# Patient Record
Sex: MALE | Race: WHITE | NOT HISPANIC OR LATINO | URBAN - METROPOLITAN AREA
[De-identification: names, ages, dates, MRNs, and addresses within clinical notes are randomized per-mention and may not be internally consistent; named-entity substitution may affect disease eponyms.]

---

## 2024-08-05 ENCOUNTER — HOSPITAL ENCOUNTER (OUTPATIENT)
Dept: GASTROENTEROLOGY | Facility: HOSPITAL | Age: 64
Discharge: HOME | End: 2024-08-05
Payer: MEDICARE

## 2024-08-05 ENCOUNTER — ANESTHESIA EVENT (OUTPATIENT)
Dept: GASTROENTEROLOGY | Facility: HOSPITAL | Age: 64
End: 2024-08-05
Payer: MEDICARE

## 2024-08-05 ENCOUNTER — ANESTHESIA (OUTPATIENT)
Dept: GASTROENTEROLOGY | Facility: HOSPITAL | Age: 64
End: 2024-08-05
Payer: MEDICARE

## 2024-08-05 VITALS
WEIGHT: 204 LBS | RESPIRATION RATE: 14 BRPM | TEMPERATURE: 97.4 F | HEART RATE: 73 BPM | SYSTOLIC BLOOD PRESSURE: 134 MMHG | OXYGEN SATURATION: 97 % | BODY MASS INDEX: 29.2 KG/M2 | DIASTOLIC BLOOD PRESSURE: 92 MMHG | HEIGHT: 70 IN

## 2024-08-05 DIAGNOSIS — Z86.010 HISTORY OF COLON POLYPS: ICD-10-CM

## 2024-08-05 DIAGNOSIS — Z12.11 SCREEN FOR COLON CANCER: ICD-10-CM

## 2024-08-05 PROCEDURE — 7100000010 HC PHASE TWO TIME - EACH INCREMENTAL 1 MINUTE

## 2024-08-05 PROCEDURE — 88305 TISSUE EXAM BY PATHOLOGIST: CPT | Mod: TC,PORLAB | Performed by: SURGERY

## 2024-08-05 PROCEDURE — 2500000005 HC RX 250 GENERAL PHARMACY W/O HCPCS: Performed by: NURSE ANESTHETIST, CERTIFIED REGISTERED

## 2024-08-05 PROCEDURE — 7100000009 HC PHASE TWO TIME - INITIAL BASE CHARGE

## 2024-08-05 PROCEDURE — 3700000001 HC GENERAL ANESTHESIA TIME - INITIAL BASE CHARGE

## 2024-08-05 PROCEDURE — 45380 COLONOSCOPY AND BIOPSY: CPT | Performed by: SURGERY

## 2024-08-05 PROCEDURE — 2500000004 HC RX 250 GENERAL PHARMACY W/ HCPCS (ALT 636 FOR OP/ED): Performed by: NURSE ANESTHETIST, CERTIFIED REGISTERED

## 2024-08-05 PROCEDURE — 2500000004 HC RX 250 GENERAL PHARMACY W/ HCPCS (ALT 636 FOR OP/ED): Performed by: SURGERY

## 2024-08-05 PROCEDURE — 3700000002 HC GENERAL ANESTHESIA TIME - EACH INCREMENTAL 1 MINUTE

## 2024-08-05 RX ORDER — LOSARTAN POTASSIUM 50 MG/1
50 TABLET ORAL DAILY
COMMUNITY

## 2024-08-05 RX ORDER — PROPOFOL 10 MG/ML
INJECTION, EMULSION INTRAVENOUS AS NEEDED
Status: DISCONTINUED | OUTPATIENT
Start: 2024-08-05 | End: 2024-08-05

## 2024-08-05 RX ORDER — DOXEPIN HYDROCHLORIDE 10 MG/1
10 CAPSULE ORAL NIGHTLY
COMMUNITY
Start: 2024-02-10

## 2024-08-05 RX ORDER — EZETIMIBE 10 MG/1
1 TABLET ORAL
COMMUNITY
Start: 2023-11-15

## 2024-08-05 RX ORDER — METOPROLOL SUCCINATE 25 MG/1
1 TABLET, EXTENDED RELEASE ORAL
COMMUNITY
Start: 2024-02-28

## 2024-08-05 RX ORDER — NAPROXEN SODIUM 220 MG/1
81 TABLET, FILM COATED ORAL
COMMUNITY

## 2024-08-05 RX ORDER — NEBIVOLOL 10 MG/1
10 TABLET ORAL
COMMUNITY

## 2024-08-05 RX ORDER — LIDOCAINE HYDROCHLORIDE 20 MG/ML
INJECTION, SOLUTION EPIDURAL; INFILTRATION; INTRACAUDAL; PERINEURAL AS NEEDED
Status: DISCONTINUED | OUTPATIENT
Start: 2024-08-05 | End: 2024-08-05

## 2024-08-05 RX ORDER — EMPAGLIFLOZIN 25 MG/1
1 TABLET, FILM COATED ORAL
COMMUNITY
Start: 2024-07-08

## 2024-08-05 RX ORDER — SODIUM CHLORIDE 9 MG/ML
20 INJECTION, SOLUTION INTRAVENOUS CONTINUOUS
Status: DISCONTINUED | OUTPATIENT
Start: 2024-08-05 | End: 2024-08-06 | Stop reason: HOSPADM

## 2024-08-05 RX ORDER — ROSUVASTATIN CALCIUM 40 MG/1
40 TABLET, COATED ORAL DAILY
COMMUNITY

## 2024-08-05 RX ORDER — CALC/MAG/B COMPLEX/D3/HERB 61
15 TABLET ORAL
COMMUNITY
Start: 2024-02-23

## 2024-08-05 SDOH — HEALTH STABILITY: MENTAL HEALTH: CURRENT SMOKER: 1

## 2024-08-05 ASSESSMENT — PAIN SCALES - GENERAL
PAINLEVEL_OUTOF10: 0 - NO PAIN
PAINLEVEL_OUTOF10: 0 - NO PAIN
PAIN_LEVEL: 0
PAINLEVEL_OUTOF10: 0 - NO PAIN

## 2024-08-05 ASSESSMENT — COLUMBIA-SUICIDE SEVERITY RATING SCALE - C-SSRS
2. HAVE YOU ACTUALLY HAD ANY THOUGHTS OF KILLING YOURSELF?: NO
6. HAVE YOU EVER DONE ANYTHING, STARTED TO DO ANYTHING, OR PREPARED TO DO ANYTHING TO END YOUR LIFE?: NO
1. IN THE PAST MONTH, HAVE YOU WISHED YOU WERE DEAD OR WISHED YOU COULD GO TO SLEEP AND NOT WAKE UP?: NO

## 2024-08-05 ASSESSMENT — PAIN - FUNCTIONAL ASSESSMENT: PAIN_FUNCTIONAL_ASSESSMENT: 0-10

## 2024-08-05 NOTE — ANESTHESIA PREPROCEDURE EVALUATION
Patient: Ricardo Nam    Procedure Information       Date/Time: 08/05/24 0930    Scheduled providers: Julián Dickson MD    Procedure: COLONOSCOPY    Location: [facility]            Relevant Problems   Anesthesia (within normal limits)       Clinical information reviewed:   Tobacco  Allergies  Meds   Med Hx  Surg Hx   Fam Hx  Soc Hx        NPO Detail:  NPO/Void Status  Carbohydrate Drink Given Prior to Surgery? : N  Date of Last Liquid: 08/05/24  Time of Last Liquid: 0730  Date of Last Solid: 08/03/24  Last Intake Type: Clear fluids         Physical Exam    Airway  Mallampati: III  TM distance: >3 FB  Neck ROM: full     Cardiovascular - normal exam     Dental - normal exam     Pulmonary - normal exam     Abdominal - normal exam             Anesthesia Plan    History of general anesthesia?: yes  History of complications of general anesthesia?: no    ASA 2     MAC     The patient is a current smoker.  Patient was previously instructed to abstain from smoking on day of procedure.  Patient smoked on day of procedure.    intravenous induction   Anesthetic plan and risks discussed with patient.    Plan discussed with CRNA.

## 2024-08-05 NOTE — ANESTHESIA POSTPROCEDURE EVALUATION
Patient: Ricardo Nam    Procedure Summary       Date: 08/05/24 Room / Location: [facility]    Anesthesia Start: 1009 Anesthesia Stop: 1043    Procedure: COLONOSCOPY Diagnosis:       Screen for colon cancer      History of colon polyps    Scheduled Providers: Julián Dickson MD Responsible Provider: JENNIFER Cobian    Anesthesia Type: MAC ASA Status: 2            Anesthesia Type: MAC    Vitals Value Taken Time   /77 08/05/24 1040   Temp 37.3 °C (99.2 °F) 08/05/24 1040   Pulse 86 08/05/24 1040   Resp 16 08/05/24 1040   SpO2 96 % 08/05/24 1040       Anesthesia Post Evaluation    Patient location during evaluation: PACU  Patient participation: complete - patient participated  Level of consciousness: awake and alert  Pain score: 0  Pain management: adequate  Airway patency: patent  Cardiovascular status: acceptable and hemodynamically stable  Respiratory status: acceptable, spontaneous ventilation and room air  Hydration status: acceptable  Postoperative Nausea and Vomiting: none        There were no known notable events for this encounter.

## 2024-08-05 NOTE — H&P
"History Of Present Illness  Ricardo Nam is a 64 y.o. male presenting with hx polyps.     Past Medical History  He has a past medical history of Diabetes type 2, controlled (Multi), Hyperlipidemia, and Hypertension.    Surgical History  He has no past surgical history on file.     Social History  He reports that he has been smoking cigarettes. He has never used smokeless tobacco. He reports that he does not currently use alcohol. He reports current drug use. Drug: Marijuana.    Family History  No family history on file.     Allergies  Patient has no known allergies.    Review of Systems   All other systems reviewed and are negative.       Physical Exam  Vitals reviewed.   Cardiovascular:      Rate and Rhythm: Normal rate and regular rhythm.   Pulmonary:      Breath sounds: Normal breath sounds.   Skin:     General: Skin is warm and dry.   Neurological:      Mental Status: He is alert.   Psychiatric:         Mood and Affect: Mood normal.          Last Recorded Vitals  Blood pressure (!) 149/94, pulse 84, temperature 36.2 °C (97.2 °F), temperature source Temporal, resp. rate 17, height 1.778 m (5' 10\"), weight 92.5 kg (204 lb), SpO2 98%.    Relevant Results               Assessment/Plan   Active Problems:  There are no active Hospital Problems.      For colo       I spent 15 minutes in the professional and overall care of this patient.      Julián Dickson MD  "

## 2024-08-09 LAB
LABORATORY COMMENT REPORT: NORMAL
PATH REPORT.FINAL DX SPEC: NORMAL
PATH REPORT.GROSS SPEC: NORMAL
PATH REPORT.RELEVANT HX SPEC: NORMAL
PATH REPORT.TOTAL CANCER: NORMAL

## 2024-08-19 ENCOUNTER — HOSPITAL ENCOUNTER (OUTPATIENT)
Dept: RADIOLOGY | Facility: CLINIC | Age: 64
Discharge: HOME | End: 2024-08-19
Payer: MEDICARE

## 2024-08-19 DIAGNOSIS — M54.16 RADICULOPATHY, LUMBAR REGION: ICD-10-CM

## 2024-08-19 PROCEDURE — 72148 MRI LUMBAR SPINE W/O DYE: CPT

## 2024-08-19 PROCEDURE — 72148 MRI LUMBAR SPINE W/O DYE: CPT | Performed by: RADIOLOGY

## 2024-10-14 ENCOUNTER — OFFICE VISIT (OUTPATIENT)
Dept: PAIN MEDICINE | Facility: HOSPITAL | Age: 64
End: 2024-10-14
Payer: MEDICARE

## 2024-10-14 VITALS
OXYGEN SATURATION: 97 % | RESPIRATION RATE: 16 BRPM | HEART RATE: 68 BPM | BODY MASS INDEX: 28.88 KG/M2 | HEIGHT: 69 IN | DIASTOLIC BLOOD PRESSURE: 82 MMHG | WEIGHT: 195 LBS | SYSTOLIC BLOOD PRESSURE: 123 MMHG

## 2024-10-14 DIAGNOSIS — M47.816 LUMBAR SPONDYLOSIS: ICD-10-CM

## 2024-10-14 DIAGNOSIS — M48.062 LUMBAR STENOSIS WITH NEUROGENIC CLAUDICATION: Primary | ICD-10-CM

## 2024-10-14 PROCEDURE — 1159F MED LIST DOCD IN RCRD: CPT | Performed by: PHYSICAL MEDICINE & REHABILITATION

## 2024-10-14 PROCEDURE — 99214 OFFICE O/P EST MOD 30 MIN: CPT | Performed by: PHYSICAL MEDICINE & REHABILITATION

## 2024-10-14 PROCEDURE — 99204 OFFICE O/P NEW MOD 45 MIN: CPT | Performed by: PHYSICAL MEDICINE & REHABILITATION

## 2024-10-14 PROCEDURE — 3008F BODY MASS INDEX DOCD: CPT | Performed by: PHYSICAL MEDICINE & REHABILITATION

## 2024-10-14 RX ORDER — GABAPENTIN 300 MG/1
CAPSULE ORAL
Qty: 180 CAPSULE | Refills: 2 | Status: SHIPPED | OUTPATIENT
Start: 2024-10-14

## 2024-10-14 RX ORDER — DIAZEPAM 5 MG/1
5 TABLET ORAL ONCE AS NEEDED
OUTPATIENT
Start: 2024-10-14

## 2024-10-14 RX ORDER — MELOXICAM 15 MG/1
15 TABLET ORAL DAILY
COMMUNITY

## 2024-10-14 RX ORDER — DICLOFENAC SODIUM 10 MG/G
4 GEL TOPICAL 4 TIMES DAILY PRN
COMMUNITY

## 2024-10-14 RX ORDER — GABAPENTIN 300 MG/1
300 CAPSULE ORAL 3 TIMES DAILY
COMMUNITY
End: 2024-10-14

## 2024-10-14 ASSESSMENT — ENCOUNTER SYMPTOMS
BOWEL INCONTINENCE: 0
WEAKNESS: 0
TINGLING: 1
NUMBNESS: 1
CHILLS: 0
OCCASIONAL FEELINGS OF UNSTEADINESS: 0
LOSS OF SENSATION IN FEET: 0
FEVER: 0
BACK PAIN: 1
UNEXPECTED WEIGHT CHANGE: 0
LEG PAIN: 1
MYALGIAS: 0

## 2024-10-14 ASSESSMENT — COLUMBIA-SUICIDE SEVERITY RATING SCALE - C-SSRS
2. HAVE YOU ACTUALLY HAD ANY THOUGHTS OF KILLING YOURSELF?: NO
1. IN THE PAST MONTH, HAVE YOU WISHED YOU WERE DEAD OR WISHED YOU COULD GO TO SLEEP AND NOT WAKE UP?: NO
6. HAVE YOU EVER DONE ANYTHING, STARTED TO DO ANYTHING, OR PREPARED TO DO ANYTHING TO END YOUR LIFE?: NO

## 2024-10-14 ASSESSMENT — PATIENT HEALTH QUESTIONNAIRE - PHQ9
2. FEELING DOWN, DEPRESSED OR HOPELESS: NOT AT ALL
SUM OF ALL RESPONSES TO PHQ9 QUESTIONS 1 AND 2: 0
1. LITTLE INTEREST OR PLEASURE IN DOING THINGS: NOT AT ALL

## 2024-10-14 NOTE — H&P (VIEW-ONLY)
Subjective   Patient ID: Ricardo Nam is a 64 y.o. male who presents for Back Pain.  Mr. Ricardo Nam is a 64M presenting with chronic right-sided low back pain that began about a year ago without any inciting event. The pain is unchanged since onset. He describes a constant dull ache concentrated at his right low back with radiation to the right greater trochanter. The pain is occasionally associated with numbness and tingling down the posterior aspect of his right lower extremity above the knee. The pain is constant, but is worst at night. He has difficulty walking, his right leg often feels heavy, and he has relief leaning forward. It has significantly changed his quality of life and interfered with his sleep. Patient reports that he was previously active throughout the entire day, playing golf or doing yard work. He has been unable to golf and recently had to hire lawn care. He rates the pain as 5/10 in severity currently, and increases to 7/10 at its worst. Patient recently completed physical therapy without any improvement. He was previously taking gabapentin 300mg once daily without improvement. He gets mild relief with Meloxicam 15mg. Denies red flag symptoms, including loss of bowel or bladder function and saddle anesthesia.     Back Pain  This is a chronic problem. The current episode started more than 1 month ago. The problem occurs constantly. The problem is unchanged. The pain is present in the lumbar spine. The pain radiates to the right thigh. The pain is at a severity of 5/10. The pain is moderate. The pain is Worse during the night. The symptoms are aggravated by sitting, standing and position. Associated symptoms include leg pain, numbness and tingling. Pertinent negatives include no bladder incontinence, bowel incontinence, fever or weakness. He has tried chiropractic manipulation, ice, NSAIDs, analgesics, heat, bed rest and home exercises for the symptoms. The treatment provided mild relief.        Patient presents to office for initial eval of lower back pain that radiates into the right hip. Patient endorses numbness and tingling.    Pain Score: 5/10    Injections/Procedures: denies    Neuromodulators/Other Medications: gabapentin 300mg, meloxicam 15, diclofenac gel topical    Other: PT completed    OSWESTRY SCORE: 32                    Monitoring and compliance:    ORT:    PDUQ:    Office Agreement:      Review of Systems   Constitutional:  Negative for chills, fever and unexpected weight change.   Gastrointestinal:  Negative for bowel incontinence.   Genitourinary:  Negative for bladder incontinence and enuresis.   Musculoskeletal:  Positive for back pain. Negative for gait problem and myalgias.   Skin:  Negative for rash.   Neurological:  Positive for tingling and numbness. Negative for weakness.        Current Outpatient Medications   Medication Instructions    aspirin 81 mg, oral, Daily RT    diclofenac sodium (VOLTAREN) 4 g, Topical, 4 times daily PRN    doxepin (SINEQUAN) 10 mg, oral, Nightly    ezetimibe (Zetia) 10 mg tablet 1 tablet, oral, Daily (0630)    gabapentin (Neurontin) 300 mg capsule Day 1 Take 300mg at bedtime, Day 2 300mg BID, Day 3 300mg TID, Day 4-6 300mg AM 300mg afternoon 600mg at bedtime, Day 7-9 300mg Am, 600mg afternoon, 600mg at bedtime, Day 10 and after 600mg TID.    Jardiance 25 mg 1 tablet, oral, Daily (0630)    lansoprazole (PREVACID) 15 mg, oral, Daily before breakfast    losartan (COZAAR) 50 mg, oral, Daily    meloxicam (MOBIC) 15 mg, oral, Daily    metoprolol succinate XL (Toprol-XL) 25 mg 24 hr tablet 1 tablet, oral, Daily (0630)    nebivolol (BYSTOLIC) 10 mg, oral, Daily RT    rosuvastatin (CRESTOR) 40 mg, oral, Daily        Past Medical History:   Diagnosis Date    Diabetes type 2, controlled (Multi)     Hyperlipidemia     Hypertension         No past surgical history on file.     No family history on file.     No Known Allergies     MR lumbar spine wo IV contrast  08/19/2024    Narrative  Interpreted By:  Drew Albert,  STUDY:  MR LUMBAR SPINE WO IV CONTRAST;  8/19/2024 8:16 am    INDICATION:  Signs/Symptoms:..    COMPARISON:  None.    ACCESSION NUMBER(S):  [id number]    ORDERING CLINICIAN:  VIVIAN GIBSON    TECHNIQUE:  The lumbar spine was studied in the sagital, axial and coronal planes  utiliing T1 and T2 weighted images.    FINDINGS:  The marrow signal and vertebral body height are normal. The conus and  sacrum are normal. Images at each interspace reveal the following:  T12/L1  There is normal alignment and vertebral body height. The disc space  is normal. There is no evidence of canal or foraminal narrowing.  There is no evidence of bulging or herniated disc. L1/L2  There is normal alignment and vertebral body height. The disc space  is normal. There is no evidence of canal or foraminal narrowing.  There is no evidence of bulging or herniated disc. L2/L3  There is normal alignment and vertebral body height. The disc space  is normal. There is no evidence of canal or foraminal narrowing.  There is no evidence of bulging or herniated disc. L3/L4  Bulging intervertebral disc and bilateral facet hypertrophy without  canal stenosis. Moderate bilateral foraminal narrowing without focal  disc herniation L4/L5  Circumferential bulging intervertebral disc. Bilateral facet  hypertrophy. No measurable canal stenosis. Moderate bilateral  foraminal narrowing. L5/S1  Circumferential bulging intervertebral disc. Bilateral facet  hypertrophy. No measurable canal stenosis. Moderate/advanced  bilateral foraminal narrowing.    Impression  * Lumbar spondylosis as described  *No measurable canal stenosis or focal disc herniation.    MACRO:  none    Signed by: Drew Albert 8/19/2024 10:15 AM  Dictation workstation:   [id number]      Objective     Vitals:    10/14/24 0942   BP: 123/82   Pulse: 68   Resp: 16   SpO2: 97%        Physical Exam  Constitutional:       Appearance:  Normal appearance.   HENT:      Head: Normocephalic.      Mouth/Throat:      Mouth: Mucous membranes are moist.   Pulmonary:      Effort: Pulmonary effort is normal.   Musculoskeletal:         General: No tenderness (No point tenderness along lumbar spine, paraspinous muscles, or SIJ), deformity or signs of injury.      Cervical back: Normal range of motion. No rigidity or tenderness.      Right lower leg: No edema.      Left lower leg: No edema.      Comments: ROM limited secondary to pain, equal with flexion and extension  Positive shopping cart sign   Skin:     General: Skin is warm and dry.   Neurological:      General: No focal deficit present.      Mental Status: He is alert and oriented to person, place, and time.      Cranial Nerves: No cranial nerve deficit.      Sensory: No sensory deficit.      Motor: No weakness.      Coordination: Coordination normal.      Gait: Gait normal.      Deep Tendon Reflexes: Reflexes normal.      Comments: Negative straight leg test bilaterally  Negative Carol's test bilaterally   Psychiatric:         Mood and Affect: Mood normal.         Behavior: Behavior normal.         GENERAL EXAM  Vital Signs: Vital signs to include heart rate, respiration rate, blood pressure, and temperature were reviewed.  General Appearance:  Awake, alert, healthy appearing, well developed, No acute distress.  Head: Normocephalic without evidence of head injury.  Neck: The appearance of the neck was normal without swelling with a midline trachea.  Eyes: The eyelids and eyebrows exhibited no abnormalities.  Pupils were not pin-point.  Sclera was without icterus.  Lungs: Respiration rhythm and depth was normal.  Respiratory movements were normal without labored breathing.  Cardiovascular: No peripheral edema was present.    Neurological: Patient was oriented to time, place, and person.  Speech was normal.  Balance, gait, and stance were unremarkable.    Psychiatric: Appearance was normal with  appropriate dress.  Mood was euthymic and affect was normal.  Skin: Affected regions were without ecchymosis or skin lesions.      Assessment/Plan   Mr. Ricardo Nam is a 64M presenting with chronic right-sided low back pain that began about a year ago likely secondary to lumbar stenosis with neurogenic claudication.  I did personally review his MRI of his lumbar spine which showed multilevel degenerative changes but I do disagree with the radiologist reading that there does appear to be mild to moderate central canal and lateral recess stenosis at L4-5 especially due to ligamentum flavum hypertrophy and facet arthropathy.  Patient recently completed physical therapy without much improvement. He was started on gabapentin 300mg but stopped taking it due to ineffectiveness. Discussed that this is a subtherapeutic dose and recommend restarting with plan to titrate higher as tolerated. Patient is agreeable. Discussed scheduling epidural steroid injection and will plan to follow-up after procedure.    Plan:  - Restart gabapentin 600mg TID  - Schedule epidural steroid injection at L4-5.  Discussed the risk benefits and alternatives and patient would like to proceed with the procedure.  - Follow-up after procedure  - Could consider the addition of duloxetine in the future.    Diagnoses and all orders for this visit:  Lumbar stenosis with neurogenic claudication  -     gabapentin (Neurontin) 300 mg capsule; Day 1 Take 300mg at bedtime, Day 2 300mg BID, Day 3 300mg TID, Day 4-6 300mg AM 300mg afternoon 600mg at bedtime, Day 7-9 300mg Am, 600mg afternoon, 600mg at bedtime, Day 10 and after 600mg TID.  -     FL pain management; Future  -     Epidural Steroid Injection; Future  Lumbar spondylosis  -     gabapentin (Neurontin) 300 mg capsule; Day 1 Take 300mg at bedtime, Day 2 300mg BID, Day 3 300mg TID, Day 4-6 300mg AM 300mg afternoon 600mg at bedtime, Day 7-9 300mg Am, 600mg afternoon, 600mg at bedtime, Day 10 and after  600mg TID.  Other orders  -     NPO Diet Except: Sips with meds; Effective now; Standing  -     Height and weight; Standing  -     Insert and maintain peripheral IV; Standing  -     Saline lock IV; Standing  -     POCT Glucose; Standing  -     Type And Screen; Standing  -     diazePAM (Valium) tablet 5 mg  -     Adult diet Regular; Standing  -     Vital Signs; Standing  -     Notify physician - Standard Parameters; Standing  -     Continue IV fluids ordered pre-procedure; Standing  -     Prior to Discharge O2 Weaning; Standing  -     Pulse oximetry, continuous; Standing  -     Discharge patient; Standing           This note was generated with the aid of dictation software, there may be typos despite my attempts at proofreading.     I saw and evaluated the patient. I personally obtained the key and critical portions of the history and physical exam or was physically present for key and critical portions performed by the resident/fellow. I reviewed the resident/fellow's documentation and discussed the patient with the resident/fellow. I agree with the resident/fellow's medical decision making as documented in the note.    Julián Byrd MD

## 2024-10-14 NOTE — PROGRESS NOTES
Subjective   Patient ID: Ricardo Nam is a 64 y.o. male who presents for Back Pain.  Mr. Ricardo Nam is a 64M presenting with chronic right-sided low back pain that began about a year ago without any inciting event. The pain is unchanged since onset. He describes a constant dull ache concentrated at his right low back with radiation to the right greater trochanter. The pain is occasionally associated with numbness and tingling down the posterior aspect of his right lower extremity above the knee. The pain is constant, but is worst at night. He has difficulty walking, his right leg often feels heavy, and he has relief leaning forward. It has significantly changed his quality of life and interfered with his sleep. Patient reports that he was previously active throughout the entire day, playing golf or doing yard work. He has been unable to golf and recently had to hire lawn care. He rates the pain as 5/10 in severity currently, and increases to 7/10 at its worst. Patient recently completed physical therapy without any improvement. He was previously taking gabapentin 300mg once daily without improvement. He gets mild relief with Meloxicam 15mg. Denies red flag symptoms, including loss of bowel or bladder function and saddle anesthesia.     Back Pain  This is a chronic problem. The current episode started more than 1 month ago. The problem occurs constantly. The problem is unchanged. The pain is present in the lumbar spine. The pain radiates to the right thigh. The pain is at a severity of 5/10. The pain is moderate. The pain is Worse during the night. The symptoms are aggravated by sitting, standing and position. Associated symptoms include leg pain, numbness and tingling. Pertinent negatives include no bladder incontinence, bowel incontinence, fever or weakness. He has tried chiropractic manipulation, ice, NSAIDs, analgesics, heat, bed rest and home exercises for the symptoms. The treatment provided mild relief.        Patient presents to office for initial eval of lower back pain that radiates into the right hip. Patient endorses numbness and tingling.    Pain Score: 5/10    Injections/Procedures: denies    Neuromodulators/Other Medications: gabapentin 300mg, meloxicam 15, diclofenac gel topical    Other: PT completed    OSWESTRY SCORE: 32                    Monitoring and compliance:    ORT:    PDUQ:    Office Agreement:      Review of Systems   Constitutional:  Negative for chills, fever and unexpected weight change.   Gastrointestinal:  Negative for bowel incontinence.   Genitourinary:  Negative for bladder incontinence and enuresis.   Musculoskeletal:  Positive for back pain. Negative for gait problem and myalgias.   Skin:  Negative for rash.   Neurological:  Positive for tingling and numbness. Negative for weakness.        Current Outpatient Medications   Medication Instructions    aspirin 81 mg, oral, Daily RT    diclofenac sodium (VOLTAREN) 4 g, Topical, 4 times daily PRN    doxepin (SINEQUAN) 10 mg, oral, Nightly    ezetimibe (Zetia) 10 mg tablet 1 tablet, oral, Daily (0630)    gabapentin (Neurontin) 300 mg capsule Day 1 Take 300mg at bedtime, Day 2 300mg BID, Day 3 300mg TID, Day 4-6 300mg AM 300mg afternoon 600mg at bedtime, Day 7-9 300mg Am, 600mg afternoon, 600mg at bedtime, Day 10 and after 600mg TID.    Jardiance 25 mg 1 tablet, oral, Daily (0630)    lansoprazole (PREVACID) 15 mg, oral, Daily before breakfast    losartan (COZAAR) 50 mg, oral, Daily    meloxicam (MOBIC) 15 mg, oral, Daily    metoprolol succinate XL (Toprol-XL) 25 mg 24 hr tablet 1 tablet, oral, Daily (0630)    nebivolol (BYSTOLIC) 10 mg, oral, Daily RT    rosuvastatin (CRESTOR) 40 mg, oral, Daily        Past Medical History:   Diagnosis Date    Diabetes type 2, controlled (Multi)     Hyperlipidemia     Hypertension         No past surgical history on file.     No family history on file.     No Known Allergies     MR lumbar spine wo IV contrast  08/19/2024    Narrative  Interpreted By:  Drew Albert,  STUDY:  MR LUMBAR SPINE WO IV CONTRAST;  8/19/2024 8:16 am    INDICATION:  Signs/Symptoms:..    COMPARISON:  None.    ACCESSION NUMBER(S):  [id number]    ORDERING CLINICIAN:  VIVIAN GIBSON    TECHNIQUE:  The lumbar spine was studied in the sagital, axial and coronal planes  utiliing T1 and T2 weighted images.    FINDINGS:  The marrow signal and vertebral body height are normal. The conus and  sacrum are normal. Images at each interspace reveal the following:  T12/L1  There is normal alignment and vertebral body height. The disc space  is normal. There is no evidence of canal or foraminal narrowing.  There is no evidence of bulging or herniated disc. L1/L2  There is normal alignment and vertebral body height. The disc space  is normal. There is no evidence of canal or foraminal narrowing.  There is no evidence of bulging or herniated disc. L2/L3  There is normal alignment and vertebral body height. The disc space  is normal. There is no evidence of canal or foraminal narrowing.  There is no evidence of bulging or herniated disc. L3/L4  Bulging intervertebral disc and bilateral facet hypertrophy without  canal stenosis. Moderate bilateral foraminal narrowing without focal  disc herniation L4/L5  Circumferential bulging intervertebral disc. Bilateral facet  hypertrophy. No measurable canal stenosis. Moderate bilateral  foraminal narrowing. L5/S1  Circumferential bulging intervertebral disc. Bilateral facet  hypertrophy. No measurable canal stenosis. Moderate/advanced  bilateral foraminal narrowing.    Impression  * Lumbar spondylosis as described  *No measurable canal stenosis or focal disc herniation.    MACRO:  none    Signed by: Drew Albert 8/19/2024 10:15 AM  Dictation workstation:   [id number]      Objective     Vitals:    10/14/24 0942   BP: 123/82   Pulse: 68   Resp: 16   SpO2: 97%        Physical Exam  Constitutional:       Appearance:  Normal appearance.   HENT:      Head: Normocephalic.      Mouth/Throat:      Mouth: Mucous membranes are moist.   Pulmonary:      Effort: Pulmonary effort is normal.   Musculoskeletal:         General: No tenderness (No point tenderness along lumbar spine, paraspinous muscles, or SIJ), deformity or signs of injury.      Cervical back: Normal range of motion. No rigidity or tenderness.      Right lower leg: No edema.      Left lower leg: No edema.      Comments: ROM limited secondary to pain, equal with flexion and extension  Positive shopping cart sign   Skin:     General: Skin is warm and dry.   Neurological:      General: No focal deficit present.      Mental Status: He is alert and oriented to person, place, and time.      Cranial Nerves: No cranial nerve deficit.      Sensory: No sensory deficit.      Motor: No weakness.      Coordination: Coordination normal.      Gait: Gait normal.      Deep Tendon Reflexes: Reflexes normal.      Comments: Negative straight leg test bilaterally  Negative Carol's test bilaterally   Psychiatric:         Mood and Affect: Mood normal.         Behavior: Behavior normal.         GENERAL EXAM  Vital Signs: Vital signs to include heart rate, respiration rate, blood pressure, and temperature were reviewed.  General Appearance:  Awake, alert, healthy appearing, well developed, No acute distress.  Head: Normocephalic without evidence of head injury.  Neck: The appearance of the neck was normal without swelling with a midline trachea.  Eyes: The eyelids and eyebrows exhibited no abnormalities.  Pupils were not pin-point.  Sclera was without icterus.  Lungs: Respiration rhythm and depth was normal.  Respiratory movements were normal without labored breathing.  Cardiovascular: No peripheral edema was present.    Neurological: Patient was oriented to time, place, and person.  Speech was normal.  Balance, gait, and stance were unremarkable.    Psychiatric: Appearance was normal with  appropriate dress.  Mood was euthymic and affect was normal.  Skin: Affected regions were without ecchymosis or skin lesions.      Assessment/Plan   Mr. Ricardo Nam is a 64M presenting with chronic right-sided low back pain that began about a year ago likely secondary to lumbar stenosis with neurogenic claudication.  I did personally review his MRI of his lumbar spine which showed multilevel degenerative changes but I do disagree with the radiologist reading that there does appear to be mild to moderate central canal and lateral recess stenosis at L4-5 especially due to ligamentum flavum hypertrophy and facet arthropathy.  Patient recently completed physical therapy without much improvement. He was started on gabapentin 300mg but stopped taking it due to ineffectiveness. Discussed that this is a subtherapeutic dose and recommend restarting with plan to titrate higher as tolerated. Patient is agreeable. Discussed scheduling epidural steroid injection and will plan to follow-up after procedure.    Plan:  - Restart gabapentin 600mg TID  - Schedule epidural steroid injection at L4-5.  Discussed the risk benefits and alternatives and patient would like to proceed with the procedure.  - Follow-up after procedure  - Could consider the addition of duloxetine in the future.    Diagnoses and all orders for this visit:  Lumbar stenosis with neurogenic claudication  -     gabapentin (Neurontin) 300 mg capsule; Day 1 Take 300mg at bedtime, Day 2 300mg BID, Day 3 300mg TID, Day 4-6 300mg AM 300mg afternoon 600mg at bedtime, Day 7-9 300mg Am, 600mg afternoon, 600mg at bedtime, Day 10 and after 600mg TID.  -     FL pain management; Future  -     Epidural Steroid Injection; Future  Lumbar spondylosis  -     gabapentin (Neurontin) 300 mg capsule; Day 1 Take 300mg at bedtime, Day 2 300mg BID, Day 3 300mg TID, Day 4-6 300mg AM 300mg afternoon 600mg at bedtime, Day 7-9 300mg Am, 600mg afternoon, 600mg at bedtime, Day 10 and after  600mg TID.  Other orders  -     NPO Diet Except: Sips with meds; Effective now; Standing  -     Height and weight; Standing  -     Insert and maintain peripheral IV; Standing  -     Saline lock IV; Standing  -     POCT Glucose; Standing  -     Type And Screen; Standing  -     diazePAM (Valium) tablet 5 mg  -     Adult diet Regular; Standing  -     Vital Signs; Standing  -     Notify physician - Standard Parameters; Standing  -     Continue IV fluids ordered pre-procedure; Standing  -     Prior to Discharge O2 Weaning; Standing  -     Pulse oximetry, continuous; Standing  -     Discharge patient; Standing           This note was generated with the aid of dictation software, there may be typos despite my attempts at proofreading.     I saw and evaluated the patient. I personally obtained the key and critical portions of the history and physical exam or was physically present for key and critical portions performed by the resident/fellow. I reviewed the resident/fellow's documentation and discussed the patient with the resident/fellow. I agree with the resident/fellow's medical decision making as documented in the note.    Julián Byrd MD

## 2024-10-21 ENCOUNTER — APPOINTMENT (OUTPATIENT)
Dept: PAIN MEDICINE | Facility: HOSPITAL | Age: 64
End: 2024-10-21
Payer: MEDICARE

## 2024-11-08 ENCOUNTER — HOSPITAL ENCOUNTER (OUTPATIENT)
Dept: GASTROENTEROLOGY | Facility: HOSPITAL | Age: 64
Discharge: HOME | End: 2024-11-08
Payer: MEDICARE

## 2024-11-08 VITALS
DIASTOLIC BLOOD PRESSURE: 80 MMHG | SYSTOLIC BLOOD PRESSURE: 153 MMHG | HEART RATE: 62 BPM | WEIGHT: 195 LBS | TEMPERATURE: 97.7 F | BODY MASS INDEX: 27.92 KG/M2 | HEIGHT: 70 IN | OXYGEN SATURATION: 99 % | RESPIRATION RATE: 14 BRPM

## 2024-11-08 DIAGNOSIS — M48.062 LUMBAR STENOSIS WITH NEUROGENIC CLAUDICATION: ICD-10-CM

## 2024-11-08 PROCEDURE — 2500000004 HC RX 250 GENERAL PHARMACY W/ HCPCS (ALT 636 FOR OP/ED): Performed by: PHYSICAL MEDICINE & REHABILITATION

## 2024-11-08 PROCEDURE — 62323 NJX INTERLAMINAR LMBR/SAC: CPT | Performed by: PHYSICAL MEDICINE & REHABILITATION

## 2024-11-08 PROCEDURE — 2550000001 HC RX 255 CONTRASTS: Performed by: PHYSICAL MEDICINE & REHABILITATION

## 2024-11-08 RX ORDER — METHYLPREDNISOLONE ACETATE 40 MG/ML
INJECTION, SUSPENSION INTRA-ARTICULAR; INTRALESIONAL; INTRAMUSCULAR; SOFT TISSUE AS NEEDED
Status: COMPLETED | OUTPATIENT
Start: 2024-11-08 | End: 2024-11-08

## 2024-11-08 RX ORDER — LIDOCAINE HYDROCHLORIDE 5 MG/ML
INJECTION, SOLUTION INFILTRATION; INTRAVENOUS AS NEEDED
Status: COMPLETED | OUTPATIENT
Start: 2024-11-08 | End: 2024-11-08

## 2024-11-08 ASSESSMENT — PAIN SCALES - GENERAL
PAINLEVEL_OUTOF10: 7
PAINLEVEL_OUTOF10: 0 - NO PAIN

## 2024-11-08 ASSESSMENT — COLUMBIA-SUICIDE SEVERITY RATING SCALE - C-SSRS
6. HAVE YOU EVER DONE ANYTHING, STARTED TO DO ANYTHING, OR PREPARED TO DO ANYTHING TO END YOUR LIFE?: NO
2. HAVE YOU ACTUALLY HAD ANY THOUGHTS OF KILLING YOURSELF?: NO
1. IN THE PAST MONTH, HAVE YOU WISHED YOU WERE DEAD OR WISHED YOU COULD GO TO SLEEP AND NOT WAKE UP?: NO

## 2024-11-08 ASSESSMENT — PAIN - FUNCTIONAL ASSESSMENT: PAIN_FUNCTIONAL_ASSESSMENT: 0-10

## 2024-11-08 NOTE — DISCHARGE INSTRUCTIONS
You had a pain management procedure today.    Observe/ monitor for the following signs & symptoms:  If you notice Excessive bleeding (slow general oozing that completely soaks the dressing, or fresh bright red bleeding).   In either case, apply pressure to the area, elevate it if possible & call your doctor at once.    Also observe for:  Change in color  Numbness/tingling  Coldness to the touch  Swelling  Drainage  Temperature of 101.5 or higher.  Increased, uncontrollable pain.    *If you notice the above signs & symptoms, please call your doctor right away!*      Discharge Instructions:    Your pain may not be gone immediately after this procedure; it generally takes 3 to 5 days for the steroid to work.   Keep the needle site clean & dry for 24 hours.  Continue your present medications.  Make an appointment to see your doctor in 2-3 weeks.  If any problems occur, or if you have any further questions, please call as soon as possible. If you find that you cannot reach your doctor, but feel that the condition nees a doctor's attention, go to the closest emergency department & take this discharge paper with you.       Dr. Byrd's Office: [phone]

## 2024-11-27 ENCOUNTER — OFFICE VISIT (OUTPATIENT)
Dept: PAIN MEDICINE | Facility: HOSPITAL | Age: 64
End: 2024-11-27
Payer: MEDICARE

## 2024-11-27 VITALS
SYSTOLIC BLOOD PRESSURE: 120 MMHG | BODY MASS INDEX: 28.46 KG/M2 | WEIGHT: 198.8 LBS | OXYGEN SATURATION: 98 % | HEIGHT: 70 IN | DIASTOLIC BLOOD PRESSURE: 78 MMHG | HEART RATE: 80 BPM | RESPIRATION RATE: 18 BRPM

## 2024-11-27 DIAGNOSIS — M47.816 LUMBAR SPONDYLOSIS: ICD-10-CM

## 2024-11-27 DIAGNOSIS — M48.062 LUMBAR STENOSIS WITH NEUROGENIC CLAUDICATION: Primary | ICD-10-CM

## 2024-11-27 PROCEDURE — 99214 OFFICE O/P EST MOD 30 MIN: CPT | Performed by: CLINICAL NURSE SPECIALIST

## 2024-11-27 PROCEDURE — 1126F AMNT PAIN NOTED NONE PRSNT: CPT | Performed by: CLINICAL NURSE SPECIALIST

## 2024-11-27 PROCEDURE — 1160F RVW MEDS BY RX/DR IN RCRD: CPT | Performed by: CLINICAL NURSE SPECIALIST

## 2024-11-27 PROCEDURE — 1159F MED LIST DOCD IN RCRD: CPT | Performed by: CLINICAL NURSE SPECIALIST

## 2024-11-27 PROCEDURE — 3008F BODY MASS INDEX DOCD: CPT | Performed by: CLINICAL NURSE SPECIALIST

## 2024-11-27 ASSESSMENT — PATIENT HEALTH QUESTIONNAIRE - PHQ9
SUM OF ALL RESPONSES TO PHQ9 QUESTIONS 1 AND 2: 0
1. LITTLE INTEREST OR PLEASURE IN DOING THINGS: NOT AT ALL
2. FEELING DOWN, DEPRESSED OR HOPELESS: NOT AT ALL

## 2024-11-27 ASSESSMENT — ENCOUNTER SYMPTOMS
OCCASIONAL FEELINGS OF UNSTEADINESS: 0
LOSS OF SENSATION IN FEET: 0
DEPRESSION: 0

## 2024-11-27 ASSESSMENT — PAIN SCALES - GENERAL: PAINLEVEL_OUTOF10: 0-NO PAIN

## 2024-11-27 NOTE — ASSESSMENT & PLAN NOTE
64 year old male presenting for follow-up of right-sided lumbar radicular symptoms consistent with lumbar stenosis/claudication.  MRI of his lumbar spine  consistent with multilevel degenerative changes with mild to moderate central canal and lateral recess stenosis at L4-5 especially due to ligamentum flavum hypertrophy and facet arthropathy.  Previously completed physical therapy with limited relief.  Had patient restart gabapentin at his last office visit for additional neuropathic/radicular relief.  Presenting at today's office visit for follow-up after an interlaminar lumbar epidural steroid injection at L4/5 targeting lumbar radicular/lumbar stenosis symptoms.  Patient currently is receiving greater than 85 to 90% relief of his lumbar radicular symptoms with this most recent injection.  He states that the lower extremity weakness has also improved.  He has noted improvement in sleep secondary to less pain.  He has been able to increase his activity level, walking further and standing longer with less discomfort.  He is very pleased with the results of this injection and feels he has been able to return to his normal level of functioning.  He has not been doing home therapy stretches and exercises.  Encourage patient to restart therapy exercises targeting his core strength, low back and lower extremity strength.  He restarted his gabapentin and is taking 300 mg twice daily.  Advised patient that this is a very low dose of gabapentin and he may get additional benefit if he increases his dose.  Recommended that he try to increase his gabapentin dose to 300 mg 3 times daily or 900 mg/day for additional neuropathic/radicular benefit.  Advised patient that he may repeat his interlaminar lumbar epidural steroid injection at L4-5 every 3 to 4 months as needed.  Advised patient to call our office if he notes return of symptoms and would like to repeat this injection.  Plan reviewed with patient at today's  visit.    -Advised patient that he may repeat his interlaminar lumbar dural steroid injection at L4-5 every 3 to 4 months as needed.  -He may call our office if he would like to repeat this injection.  -Advised patient to continue his gabapentin and attempt to increase his dose to 300 mg 3 times daily or 900 mg/day for additional neuropathic/radicular relief.  The patient was counseled on the risks and potential side effects of gabapentin as well as its importance for dosage titration. Side effects included but were not limited to, drowsiness, sedation, cognitive decline, peripheral edema, weight gain, seizures, with abrupt withdrawal.  Patient was instructed to call the office with any concerns or side effects before abruptly stopping medication.   -Recommended he restart home therapy exercises and stretches targeting his core strength, low back and lower extremity strengthening.  Advised patient to do these exercises 5 to 6 days a week for optimal benefit.  -Patient may follow-up in our office as needed.     Plan:

## 2024-11-27 NOTE — PROGRESS NOTES
Follow Up Visit    Location of Pain: low back pain-right side, still has pain, tingling down right leg to thigh-not as bad prior to injection. Back pain has improved, leg pain not as frequent.         Pain Score: 0/10    Other pain medication/neuromodulator: gabapentin, THC gummies, meloxicam-takes prn    Therapeutic Goals:    Injections and/or Procedures: 11/8/24    Other: no PT currently  OA: 10/14/24  Oswestry score 2  11/27/24      Subjective   Patient ID: Ricardo Nam is a 64 y.o. male who presents for lumbar radicular pain      HPI    64-year-old male who presents for follow-up of chronic right sided lumbar radicular pain.  States his pain began approximately 10 months ago without any inciting event.  Patient was experiencing right lower back pain radiating to his right lower extremity.  His pain was associated with numbness and tingling radiating to his right lateral thigh accompanied by weakness in his right lower extremity.  Symptoms consistent with lumbar radiculopathy as well as lumbar stenosis with claudication.  He has done a formal course of physical therapy in the past with limited relief.  MRI lumbar spine consistent with multilevel degenerative changes; mild to moderate central canal and lateral recess stenosis at L4-5 due to ligamentum flavum hypertrophy and facet hypertrophy.  Previously had taken gabapentin at a lower dose and discontinued it secondary to ineffectiveness.  Restarted his gabapentin with titration up to 600 mg 3 times daily.  Due to persistent pain and failure with conservative measures, the patient was scheduled for an interlaminar lumbar epidural steroid injection at L4-5 targeting lumbar radicular symptoms.  He is presenting at today's office visit for follow-up after his injection on 11/8/2024.  Patient is currently endorsing 85 to 90% relief of lumbar radicular pain with this injection.  He no longer has low back pain or radicular symptoms in his right lower extremity.   He has intermittent numbness and tingling in his left lateral thigh, although he feels that it is less often and less intense.  He denies any lower extremity weakness and states that this improved with his injection.  He feels that he is able to stand longer and walk further with less discomfort after his injection.  He also feels that his posture has improved and he is no longer noting forward.  He has noted that he is able to sleep better secondary to improvement in pain.  Continued benefit with gabapentin which she currently is taking 300 mg twice per day.  He does feel that this medication is beneficial at this dose.  No longer needing ibuprofen or Tylenol.  He has not been consistently doing home therapy exercises and stretches.    OARRS:  No data recorded  I have personally reviewed the OARRS report for Ircardo OLEG Nam. I have considered the risks of abuse, dependence, addiction and diversion    Is the patient prescribed a combination of a benzodiazepine and opioid?  No    Last Urine Drug Screen / ordered today: No  No results found for this or any previous visit (from the past 8760 hours).  N/A    Controlled Substance Agreement:  Date of the Last Agreement:       Monitoring and compliance:      Office Agreement: 10/14/24  Oswestry score 2    Review of Systems    ROS:   General: No fevers, chills, weight loss  Skin: Negative for lesions  Eyes: No acute vision changes  Ears: No vertigo  Nose, mouth, throat: No difficulty swallowing or speaking  Respiratory: No cough, shortness of breath, cyanosis  Cardiovascular: Negative for chest pain syncope or palpitation  Gastrointestinal: No constipation, nausea, vomiting  Neurological: Negative for headache, positive for: Intermittent paresthesia right thigh  Psychological: Negative for severe or debilitating anxiety, depression. Negative memory loss  Musculoskeletal: Positive for arthralgia, myalgia and pain  Endocrine: Negative for weight gain, appetite changes,  excessive sweating  Allergy/immune: Negative    All 13 systems were reviewed and are within normal levels except as noted or in the history of present illness.  Positive or pertinent negative responses are noted or were in the history of present illness. As noted, the patient denies significant or impairing weakness in the bilateral upper and lower extremities, medication induced constipation, and bowel or bladder incontinence.     Current Outpatient Medications:     aspirin 81 mg chewable tablet, Chew 1 tablet (81 mg) once daily., Disp: , Rfl:     diclofenac sodium (Voltaren) 1 % gel, Apply 4.5 inches (4 g) topically 4 times a day as needed. (Patient not taking: Reported on 11/8/2024), Disp: , Rfl:     doxepin (SINEquan) 10 mg capsule, Take 1 capsule (10 mg) by mouth once daily at bedtime., Disp: , Rfl:     ezetimibe (Zetia) 10 mg tablet, Take 1 tablet (10 mg) by mouth early in the morning.., Disp: , Rfl:     gabapentin (Neurontin) 300 mg capsule, Day 1 Take 300mg at bedtime, Day 2 300mg BID, Day 3 300mg TID, Day 4-6 300mg AM 300mg afternoon 600mg at bedtime, Day 7-9 300mg Am, 600mg afternoon, 600mg at bedtime, Day 10 and after 600mg TID., Disp: 180 capsule, Rfl: 2    Jardiance 25 mg, Take 1 tablet (25 mg) by mouth early in the morning.., Disp: , Rfl:     lansoprazole (Prevacid) 15 mg DR capsule, Take 1 capsule (15 mg) by mouth once daily in the morning. Take before meals., Disp: , Rfl:     losartan (Cozaar) 50 mg tablet, Take 1 tablet (50 mg) by mouth once daily., Disp: , Rfl:     meloxicam (Mobic) 15 mg tablet, Take 1 tablet (15 mg) by mouth once daily., Disp: , Rfl:     metoprolol succinate XL (Toprol-XL) 25 mg 24 hr tablet, Take 1 tablet (25 mg) by mouth early in the morning.., Disp: , Rfl:     nebivolol (Bystolic) 10 mg tablet, Take 1 tablet (10 mg) by mouth once daily., Disp: , Rfl:     rosuvastatin (Crestor) 40 mg tablet, Take 1 tablet (40 mg) by mouth once daily., Disp: , Rfl:      Past Medical History:    Diagnosis Date    Diabetes type 2, controlled (Multi)     Hyperlipidemia     Hypertension         Past Surgical History:   Procedure Laterality Date    APPENDECTOMY      VASECTOMY          No family history on file.     No Known Allergies     Objective     Visit Vitals  Smoking Status Every Day        Physical Exam    PE:  General: Well-developed, well-nourished, no acute distress. The patient demonstrates no pain behavior, symptom magnification or overt drug-seeking behavior.  Eye: Pupils appropriate for room lighting  Neck/thyroid: No obvious goiter or enlargement of neck noted  Respiratory exam: Normal respiratory effort, unlabored respiration. No accessory muscle use noted  Cardiac exam: Bilateral radial pulses intact  Abdominal: Nondistended  Spine, lumbar: The patient is able to rise from a seated to standing position without hesitancy, push off, or delay. Gait is grossly nonantalgic.  Minimal tenderness to paraspinous musculature is noted lower lumbar region.  Flexion intact with extension more limited and increasing pain to his lower back.  Negative straight leg raise.  Neurologic exam: Muscle strength is antigravity in all 4 extremities.  Equal strength bilateral lower extremities 5/5.  Psychiatric exam: Judgment and insight normal, affect normal, speech is fluent, affect appropriate, demonstrating no signs of hypersomnolence, sedation, or confusion        Assessment/Plan   Problem List Items Addressed This Visit             ICD-10-CM    Lumbar stenosis with neurogenic claudication - Primary M48.062     64 year old male presenting for follow-up of right-sided lumbar radicular symptoms consistent with lumbar stenosis/claudication.  MRI of his lumbar spine  consistent with multilevel degenerative changes with mild to moderate central canal and lateral recess stenosis at L4-5 especially due to ligamentum flavum hypertrophy and facet arthropathy.  Previously completed physical therapy with limited relief.   Had patient restart gabapentin at his last office visit for additional neuropathic/radicular relief.  Presenting at today's office visit for follow-up after an interlaminar lumbar epidural steroid injection at L4/5 targeting lumbar radicular/lumbar stenosis symptoms.  Patient currently is receiving greater than 85 to 90% relief of his lumbar radicular symptoms with this most recent injection.  He states that the lower extremity weakness has also improved.  He has noted improvement in sleep secondary to less pain.  He has been able to increase his activity level, walking further and standing longer with less discomfort.  He is very pleased with the results of this injection and feels he has been able to return to his normal level of functioning.  He has not been doing home therapy stretches and exercises.  Encourage patient to restart therapy exercises targeting his core strength, low back and lower extremity strength.  He restarted his gabapentin and is taking 300 mg twice daily.  Advised patient that this is a very low dose of gabapentin and he may get additional benefit if he increases his dose.  Recommended that he try to increase his gabapentin dose to 300 mg 3 times daily or 900 mg/day for additional neuropathic/radicular benefit.  Advised patient that he may repeat his interlaminar lumbar epidural steroid injection at L4-5 every 3 to 4 months as needed.  Advised patient to call our office if he notes return of symptoms and would like to repeat this injection.  Plan reviewed with patient at today's visit.    -Advised patient that he may repeat his interlaminar lumbar dural steroid injection at L4-5 every 3 to 4 months as needed.  -He may call our office if he would like to repeat this injection.  -Advised patient to continue his gabapentin and attempt to increase his dose to 300 mg 3 times daily or 900 mg/day for additional neuropathic/radicular relief.  The patient was counseled on the risks and potential side  effects of gabapentin as well as its importance for dosage titration. Side effects included but were not limited to, drowsiness, sedation, cognitive decline, peripheral edema, weight gain, seizures, with abrupt withdrawal.  Patient was instructed to call the office with any concerns or side effects before abruptly stopping medication.   -Recommended he restart home therapy exercises and stretches targeting his core strength, low back and lower extremity strengthening.  Advised patient to do these exercises 5 to 6 days a week for optimal benefit.  -Patient may follow-up in our office as needed.     Plan:         Lumbar spondylosis M47.816

## 2025-01-16 ENCOUNTER — TELEPHONE (OUTPATIENT)
Dept: PAIN MEDICINE | Facility: HOSPITAL | Age: 65
End: 2025-01-16
Payer: MEDICARE

## 2025-01-16 ENCOUNTER — PREP FOR PROCEDURE (OUTPATIENT)
Dept: PAIN MEDICINE | Facility: HOSPITAL | Age: 65
End: 2025-01-16
Payer: MEDICARE

## 2025-01-16 DIAGNOSIS — M48.062 LUMBAR STENOSIS WITH NEUROGENIC CLAUDICATION: Primary | ICD-10-CM

## 2025-01-16 RX ORDER — DIAZEPAM 5 MG/1
5 TABLET ORAL ONCE AS NEEDED
OUTPATIENT
Start: 2025-01-16

## 2025-01-16 NOTE — TELEPHONE ENCOUNTER
Called pt and reviewed pre and post injection teaching with patient for LESI L4/L5.  Pt denies taking blood thinners.  Pt denies upcoming vaccine.  Pt verbalizes understanding and compliance regarding instructions given.  Pt transferred to JS to schedule injection.  Yenni Reddy RN

## 2025-01-16 NOTE — TELEPHONE ENCOUNTER
Patients wife calling and would like to repeat Interlaminar Epidural Steroid Injection L3/ L4 last injection 11/08/2024 ..  Can you please place a order for injection or do we need to see patient in office

## 2025-01-31 ENCOUNTER — HOSPITAL ENCOUNTER (OUTPATIENT)
Dept: GASTROENTEROLOGY | Facility: HOSPITAL | Age: 65
Discharge: HOME | End: 2025-01-31
Payer: MEDICARE

## 2025-01-31 VITALS
HEIGHT: 70 IN | OXYGEN SATURATION: 97 % | DIASTOLIC BLOOD PRESSURE: 85 MMHG | RESPIRATION RATE: 16 BRPM | WEIGHT: 195 LBS | TEMPERATURE: 97.4 F | BODY MASS INDEX: 27.92 KG/M2 | HEART RATE: 79 BPM | SYSTOLIC BLOOD PRESSURE: 130 MMHG

## 2025-01-31 DIAGNOSIS — M48.062 LUMBAR STENOSIS WITH NEUROGENIC CLAUDICATION: ICD-10-CM

## 2025-01-31 PROCEDURE — 2500000004 HC RX 250 GENERAL PHARMACY W/ HCPCS (ALT 636 FOR OP/ED): Performed by: PHYSICAL MEDICINE & REHABILITATION

## 2025-01-31 PROCEDURE — 2550000001 HC RX 255 CONTRASTS: Performed by: PHYSICAL MEDICINE & REHABILITATION

## 2025-01-31 PROCEDURE — 62323 NJX INTERLAMINAR LMBR/SAC: CPT | Performed by: PHYSICAL MEDICINE & REHABILITATION

## 2025-01-31 RX ORDER — LIDOCAINE HYDROCHLORIDE 5 MG/ML
INJECTION, SOLUTION INFILTRATION; INTRAVENOUS AS NEEDED
Status: COMPLETED | OUTPATIENT
Start: 2025-01-31 | End: 2025-01-31

## 2025-01-31 RX ORDER — METHYLPREDNISOLONE ACETATE 40 MG/ML
INJECTION, SUSPENSION INTRA-ARTICULAR; INTRALESIONAL; INTRAMUSCULAR; SOFT TISSUE AS NEEDED
Status: COMPLETED | OUTPATIENT
Start: 2025-01-31 | End: 2025-01-31

## 2025-01-31 RX ADMIN — LIDOCAINE HYDROCHLORIDE 15 ML: 5 INJECTION, SOLUTION INFILTRATION at 11:17

## 2025-01-31 RX ADMIN — IOHEXOL 5 ML: 350 INJECTION, SOLUTION INTRAVENOUS at 11:17

## 2025-01-31 RX ADMIN — METHYLPREDNISOLONE ACETATE 40 MG: 40 INJECTION, SUSPENSION INTRA-ARTICULAR; INTRALESIONAL; INTRAMUSCULAR; SOFT TISSUE at 11:17

## 2025-01-31 ASSESSMENT — PAIN SCALES - GENERAL
PAINLEVEL_OUTOF10: 0 - NO PAIN
PAINLEVEL_OUTOF10: 5 - MODERATE PAIN

## 2025-01-31 ASSESSMENT — PAIN - FUNCTIONAL ASSESSMENT
PAIN_FUNCTIONAL_ASSESSMENT: 0-10
PAIN_FUNCTIONAL_ASSESSMENT: 0-10

## 2025-01-31 NOTE — DISCHARGE INSTRUCTIONS
You had a pain management procedure today.    Observe/ monitor for the following signs & symptoms:  If you notice Excessive bleeding (slow general oozing that completely soaks the dressing, or fresh bright red bleeding).   In either case, apply pressure to the area, elevate it if possible & call your doctor at once.    Also observe for:  Change in color  Numbness/tingling  Coldness to the touch  Swelling  Drainage  Temperature of 101.5 or higher.  Increased, uncontrollable pain.    *If you notice the above signs & symptoms, please call your doctor right away!*      Discharge Instructions:    Your pain may not be gone immediately after this procedure; it generally takes 3 to 5 days for the steroid to work.   Keep the needle site clean & dry for 24 hours.  Continue your present medications.  Make an appointment to see your doctor in 2-3 weeks.  If any problems occur, or if you have any further questions, please call as soon as possible. If you find that you cannot reach your doctor, but feel that the condition nees a doctor's attention, go to the closest emergency department & take this discharge paper with you.       Dr. Byrd's Office: [phone]

## 2025-01-31 NOTE — H&P
"HISTORY AND PHYSICAL    History Of Present Illness  Ricardo Nam is a 64 y.o. male presenting with chronic pain.  Here for Lumbar interlaminar epidural steroid injection    he denies any recent antibiotic use or infections, he denies any blood thinner use , and he has no known medical allergies    Past Medical History  Past Medical History:   Diagnosis Date    Diabetes type 2, controlled (Multi)     Hyperlipidemia     Hypertension        Surgical History  Past Surgical History:   Procedure Laterality Date    APPENDECTOMY      VASECTOMY          Social History  He reports that he has quit smoking. His smoking use included cigarettes. He has never used smokeless tobacco. He reports current alcohol use. He reports current drug use. Drug: Marijuana.    Family History  No family history on file.     Allergies  Patient has no known allergies.    Review of Systems   12 point ROS done and negative except for the above.   Physical Exam     General: NAD, well groomed, well nourished  Eyes: Non-icteric sclera, EOMI  Ears, Nose, Mouth, and Throat: External ears and nose appear to be without deformity or rash. No lesions or masses noted. Hearing is grossly intact.   Neck: Trachea midline  Respiratory: Nonlabored breathing   Cardiovascular: No peripheral edema   Skin: No rashes or open lesions/ulcers identified on skin.    Last Recorded Vitals  Blood pressure 103/77, pulse 82, temperature 36.3 °C (97.4 °F), temperature source Temporal, resp. rate 16, height 1.778 m (5' 10\"), weight 88.5 kg (195 lb), SpO2 97%.    Relevant Results           Assessment/Plan       Risks, benefits, alternatives discussed. All questions answered to the best of my ability. Patient agrees to proceed.   -We will proceed with planned procedure          "

## 2025-03-12 ENCOUNTER — OFFICE VISIT (OUTPATIENT)
Dept: PAIN MEDICINE | Facility: HOSPITAL | Age: 65
End: 2025-03-12
Payer: MEDICARE

## 2025-03-12 VITALS
OXYGEN SATURATION: 96 % | DIASTOLIC BLOOD PRESSURE: 74 MMHG | WEIGHT: 200 LBS | RESPIRATION RATE: 18 BRPM | HEART RATE: 85 BPM | BODY MASS INDEX: 28.63 KG/M2 | HEIGHT: 70 IN | SYSTOLIC BLOOD PRESSURE: 105 MMHG

## 2025-03-12 DIAGNOSIS — M47.816 LUMBAR SPONDYLOSIS: ICD-10-CM

## 2025-03-12 DIAGNOSIS — M48.062 LUMBAR STENOSIS WITH NEUROGENIC CLAUDICATION: Primary | ICD-10-CM

## 2025-03-12 PROCEDURE — G2211 COMPLEX E/M VISIT ADD ON: HCPCS | Performed by: CLINICAL NURSE SPECIALIST

## 2025-03-12 PROCEDURE — 99214 OFFICE O/P EST MOD 30 MIN: CPT | Performed by: CLINICAL NURSE SPECIALIST

## 2025-03-12 PROCEDURE — 3008F BODY MASS INDEX DOCD: CPT | Performed by: CLINICAL NURSE SPECIALIST

## 2025-03-12 PROCEDURE — 1160F RVW MEDS BY RX/DR IN RCRD: CPT | Performed by: CLINICAL NURSE SPECIALIST

## 2025-03-12 PROCEDURE — 1159F MED LIST DOCD IN RCRD: CPT | Performed by: CLINICAL NURSE SPECIALIST

## 2025-03-12 RX ORDER — GABAPENTIN 300 MG/1
CAPSULE ORAL
Qty: 120 CAPSULE | Refills: 2 | Status: SHIPPED | OUTPATIENT
Start: 2025-03-12

## 2025-03-12 RX ORDER — DIAZEPAM 5 MG/1
5 TABLET ORAL ONCE AS NEEDED
OUTPATIENT
Start: 2025-03-12

## 2025-03-12 ASSESSMENT — ENCOUNTER SYMPTOMS
OCCASIONAL FEELINGS OF UNSTEADINESS: 0
LOSS OF SENSATION IN FEET: 0
DEPRESSION: 0

## 2025-03-12 NOTE — H&P (VIEW-ONLY)
Subjective   Patient ID: Ricardo Nam is a 65 y.o. male who presents for lumbar stenosis with claudication  HPI    65-year-old male presents for follow-up of chronic right-sided lumbar radicular pain.  Pain began approximately 1 year ago without any inciting event.  Experiencing low back pain radiating into his right buttock/hip and into his right lower extremity.  Symptoms consistent with lumbar stenosis with claudication as well as lumbar radiculopathy.  He has done formal physical therapy multiple times in the past.  MRI lumbar spine consistent with multilevel degenerative changes; mild to moderate central canal and lateral recess stenosis at L4-5 due to ligamentum flavum hypertrophy and facet hypertrophy; patient also has an element of stenosis at L2-3 as well as L3-4 which may be contributing to radicular symptoms.  Patient called our office after his last and wished to repeat an interlaminar lumbar epidural steroid injection.  As far as medication, discussed slowly increasing his gabapentin dose for additional neuropathic pain relief. Presenting at today's office visit for follow-up after an interlaminar lumbar epidural steroid injection at L3-4.  Reviewed imaging and procedure with Dr. Byrd who attempted and interlaminar lumbar epidural steroid injection at 1 level higher to capture stenosis at L2-3 as well as L3-4.  Patient states he received moderate relief with this injection.  He states that it took a little longer for him to receive relief with this injection; approximately 10 to 14 days.  He has noted improvement in the intensity/sharpness of his pain after his injection, however, he continues to experience radicular symptoms radiating to his right lower extremity to the level of his ankle. He has intermittent numbness and tingling in his right lower extremity and right foot.  Denies any lower extremity weakness or changes in bowel/bladder function.  Pain increases with prolonged standing,  "walking.  Pain interrupts his sleep.  Pain is described as aching and throbbing.  He felt that he may have done better with the first injection at L4-5 with more immediate and lasting relief. He does feel that he seen his gabapentin dose has added additional pain relief.  Currently tolerating 300 mg 3 times daily.  Continues home exercises and stretches.      Location of Pain: low back pain-right side, still has pain, tingling down right leg to thigh. Back pain has improved, leg painnot as frequent. Patient presents today post repeat L3/4 interlaminar AREN and reports minimal relief. States that symptoms are still present but they are \"Muted\" in intensity. Pain is worst with prolonged inactivity.          Pain Score: 6/10     Other pain medication/neuromodulator: gabapentin 300mg BID and 600mg HS -- unsure if needs refilelod, will call if so, THC gummies, meloxicam-takes prn     Therapeutic Goals:     Injections and/or Procedures: 11/8/24 L3/L4 with relief, repeat L3/4 on 01/31/2025 with                 OARRS:  Laina Zhang, APRN-CNP, APRN-CNS on 3/12/2025 12:51 PM  I have personally reviewed the OARRS report for Ricardo Nam. I have considered the risks of abuse, dependence, addiction and diversion        Review of Systems      ROS:   General: No fevers, chills, weight loss  Skin: Negative for lesions  Eyes: No acute vision changes  Ears: No vertigo  Nose, mouth, throat: No difficulty swallowing or speaking  Respiratory: No cough, shortness of breath, cyanosis  Cardiovascular: Negative for chest pain syncope or palpitation  Gastrointestinal: No constipation, nausea, vomiting  Neurological: Negative for headache, positive for: Paresthesia  Psychological: Negative for severe or debilitating anxiety, depression. Negative memory loss  Musculoskeletal: Positive for arthralgia, myalgia and pain  Endocrine: Negative for weight gain, appetite changes, excessive sweating  Allergy/immune: Negative    All 13 systems were " reviewed and are within normal levels except as noted or in the history of present illness.  Positive or pertinent negative responses are noted or were in the history of present illness. As noted, the patient denies significant or impairing weakness in the bilateral upper and lower extremities, medication induced constipation, and bowel or bladder incontinence.      Current Outpatient Medications   Medication Instructions    aspirin 81 mg, Daily RT    diclofenac sodium (VOLTAREN) 4 g, 4 times daily PRN    doxepin (SINEQUAN) 10 mg, Nightly    ezetimibe (Zetia) 10 mg tablet 1 tablet, Daily (0630)    gabapentin (Neurontin) 300 mg capsule Take 300 mg of gabapentin twice daily and 600 mg at bedtime.    Jardiance 25 mg 1 tablet, Daily (0630)    lansoprazole (PREVACID) 15 mg, Daily before breakfast    losartan (COZAAR) 50 mg, Daily    meloxicam (MOBIC) 15 mg, Daily    metoprolol succinate XL (Toprol-XL) 25 mg 24 hr tablet 1 tablet, Daily (0630)    nebivolol (BYSTOLIC) 10 mg, Daily RT    rosuvastatin (CRESTOR) 40 mg, Daily        Past Medical History:   Diagnosis Date    Diabetes type 2, controlled (Multi)     Hyperlipidemia     Hypertension         Past Surgical History:   Procedure Laterality Date    APPENDECTOMY      VASECTOMY          No family history on file.     No Known Allergies     MR lumbar spine wo IV contrast 08/19/2024    Narrative  Interpreted By:  Drew Albert,  STUDY:  MR LUMBAR SPINE WO IV CONTRAST;  8/19/2024 8:16 am    INDICATION:  Signs/Symptoms:..    COMPARISON:  None.    ACCESSION NUMBER(S):  [id number]    ORDERING CLINICIAN:  VIVIAN GIBSON    TECHNIQUE:  The lumbar spine was studied in the sagital, axial and coronal planes  utiliing T1 and T2 weighted images.    FINDINGS:  The marrow signal and vertebral body height are normal. The conus and  sacrum are normal. Images at each interspace reveal the following:  T12/L1  There is normal alignment and vertebral body height. The disc space  is  normal. There is no evidence of canal or foraminal narrowing.  There is no evidence of bulging or herniated disc. L1/L2  There is normal alignment and vertebral body height. The disc space  is normal. There is no evidence of canal or foraminal narrowing.  There is no evidence of bulging or herniated disc. L2/L3  There is normal alignment and vertebral body height. The disc space  is normal. There is no evidence of canal or foraminal narrowing.  There is no evidence of bulging or herniated disc. L3/L4  Bulging intervertebral disc and bilateral facet hypertrophy without  canal stenosis. Moderate bilateral foraminal narrowing without focal  disc herniation L4/L5  Circumferential bulging intervertebral disc. Bilateral facet  hypertrophy. No measurable canal stenosis. Moderate bilateral  foraminal narrowing. L5/S1  Circumferential bulging intervertebral disc. Bilateral facet  hypertrophy. No measurable canal stenosis. Moderate/advanced  bilateral foraminal narrowing.    Impression  * Lumbar spondylosis as described  *No measurable canal stenosis or focal disc herniation.    MACRO:  none    Signed by: Drew Albert 8/19/2024 10:15 AM  Dictation workstation:   [id number]      Objective     Vitals:    03/12/25 1133   BP: 105/74   Pulse: 85   Resp: 18   SpO2: 96%               Physical Exam    GENERAL EXAM  Vital Signs: Vital signs to include heart rate, respiration rate, blood pressure, and temperature were reviewed.  General Appearance:  Awake, alert, healthy appearing, well developed, No acute distress.  Head: Normocephalic without evidence of head injury.  Neck: The appearance of the neck was normal without swelling with a midline trachea.  Eyes: The eyelids and eyebrows exhibited no abnormalities.  Pupils were not pin-point.  Sclera was without icterus.  Lungs: Respiration rhythm and depth was normal.  Respiratory movements were normal without labored breathing.  Cardiovascular: No peripheral edema was present.     Spine, lumbar: Patient is able to rise from seated to standing position without hesitancy, push off or delay.  Tenderness to paraspinous musculature lower lumbar region right. Flexion intact with extension more limited and increasing low back pain.  Positive straight leg raise on the right.  Positive for right SI joint tenderness.  Mildly positive Carol, Gaenslen's and sacral thrust on the right.  Neurological: Patient was oriented to time, place, and person.  Speech was normal.  Balance, gait, and stance were unremarkable.    Psychiatric: Appearance was normal with appropriate dress.  Mood was euthymic and affect was normal.  Skin: Affected regions were without ecchymosis or skin lesions.              Assessment/Plan   Problem List Items Addressed This Visit             ICD-10-CM    Lumbar stenosis with neurogenic claudication - Primary M48.062     65 year old male presenting for follow-up of right-sided lumbar radicular symptoms consistent with lumbar stenosis/claudication.  MRI of his lumbar spine  consistent with multilevel degenerative changes with mild to moderate central canal and lateral recess stenosis at L2-3, L3-4 and L4-5; ligamentum flavum hypertrophy and facet arthropathy most noted at L4-5.  Previously completed physical therapy with limited relief.  Restarted gabapentin and was instructed to slowly increase his dose to 300 mg twice per day and 600 at bedtime.  He has done well with previous interlaminar lumbar epidural steroid injection at L4-5 providing significant/sustained relief.  He called our office after his last appointment when he noted return of symptoms and was scheduled to repeat an interlaminar lumbar epidural steroid injection which was done 1 level higher to capture stenosis at L2-3 and L3-4.  Presenting at today's office visit for follow-up after an interlaminar lumbar epidural steroid injection at L3-4 targeting lumbar radicular/lumbar stenosis symptoms.  Patient states that he  received moderate relief with this injection.  He has noted a decrease in the intensity/sharpness of his pain.  He does continue to experience lumbar pain radiating to his right lower extremity to the level of his ankle.  Intermittent numbness and tingling in his right lower extremity.  He denies any weakness or changes in bowel/bladder function.  Medical necessity: The patient is presenting primarily with Lumbar pain and radicular symptoms and claudication symptoms.  The pain is constant and of moderate severity that interferes with activities of daily living and sleep. The patient failed conservative therapy to include Tylenol/NSAIDS and physical therapy/supervised home exercise program and continues to participate in their supervised home exercise program.  Lumbar spine MRI which I personally reviewed showed multilevel degenerative changes with mild to moderate central canal and lateral recess stenosis at L2-3, L3-4 and L4-5; ligamentum flavum hypertrophy and facet arthropathy most noted at L4-5. The patient has previously undergone a Lumbar Intralaminar Epidural Steroid Injection at L3-4 inadequate pain relief.  Based on these results will plan to change the level(s) of the injection.  Will proceed with Lumbar Intralaminar Epidural Steroid Injection at L4-5 with fluoroscopy.  We discussed the risks, benefits and alternatives to the procedure(s) and the patient would like to proceed.  This procedure is part of a comprehensive and multimodal treatment plan to facilitate physical therapy and a supervised home exercise program.   As far as medication, the patient was unable to increase his gabapentin and currently is taking 300 mg 3 times daily.  Recommended patient try to slowly increase his dose of gabapentin for additional neuropathic/radicular pain relief.  At this time he will increase his gabapentin to 300 mg twice daily and 600 mg at bedtime.  Plan reviewed with patient at today's visit.    -Scheduled to move  forward with an interlaminar lumbar epidural steroid injection at L4-5 under fluoroscopic guidance.  Reviewed risks and benefits and the patient would like to proceed.    -Advised patient to increase gabapentin to 300 mg twice daily and 600 mg at bedtime. The patient was counseled on the risks and potential side effects of gabapentin as well as its importance for dosage titration. Side effects included but were not limited to, drowsiness, sedation, cognitive decline, peripheral edema, weight gain, seizures, with abrupt withdrawal.  Patient was instructed to call the office with any concerns or side effects before abruptly stopping medication.   -Recommended he continue home therapy exercises and stretches targeting his core strength, low back and lower extremity strengthening.  Advised patient to do these exercises 5 to 6 days a week for optimal benefit.  -Patient will follow-up in our office approximately 6 weeks after his injection for reevaluation.           Relevant Medications    gabapentin (Neurontin) 300 mg capsule    Other Relevant Orders    [place] pain management    Epidural Steroid Injection    Lumbar spondylosis M47.816    Relevant Medications    gabapentin (Neurontin) 300 mg capsule              This note was generated with the aid of dictation software, there may be typos despite my attempts at proofreading.

## 2025-03-12 NOTE — ASSESSMENT & PLAN NOTE
65 year old male presenting for follow-up of right-sided lumbar radicular symptoms consistent with lumbar stenosis/claudication.  MRI of his lumbar spine  consistent with multilevel degenerative changes with mild to moderate central canal and lateral recess stenosis at L2-3, L3-4 and L4-5; ligamentum flavum hypertrophy and facet arthropathy most noted at L4-5.  Previously completed physical therapy with limited relief.  Restarted gabapentin and was instructed to slowly increase his dose to 300 mg twice per day and 600 at bedtime.  He has done well with previous interlaminar lumbar epidural steroid injection at L4-5 providing significant/sustained relief.  He called our office after his last appointment when he noted return of symptoms and was scheduled to repeat an interlaminar lumbar epidural steroid injection which was done 1 level higher to capture stenosis at L2-3 and L3-4.  Presenting at today's office visit for follow-up after an interlaminar lumbar epidural steroid injection at L3-4 targeting lumbar radicular/lumbar stenosis symptoms.  Patient states that he received moderate relief with this injection.  He has noted a decrease in the intensity/sharpness of his pain.  He does continue to experience lumbar pain radiating to his right lower extremity to the level of his ankle.  Intermittent numbness and tingling in his right lower extremity.  He denies any weakness or changes in bowel/bladder function.  Medical necessity: The patient is presenting primarily with Lumbar pain and radicular symptoms and claudication symptoms.  The pain is constant and of moderate severity that interferes with activities of daily living and sleep. The patient failed conservative therapy to include Tylenol/NSAIDS and physical therapy/supervised home exercise program and continues to participate in their supervised home exercise program.  Lumbar spine MRI which I personally reviewed showed multilevel degenerative changes with mild to  moderate central canal and lateral recess stenosis at L2-3, L3-4 and L4-5; ligamentum flavum hypertrophy and facet arthropathy most noted at L4-5. The patient has previously undergone a Lumbar Intralaminar Epidural Steroid Injection at L3-4 inadequate pain relief.  Based on these results will plan to change the level(s) of the injection.  Will proceed with Lumbar Intralaminar Epidural Steroid Injection at L4-5 with fluoroscopy.  We discussed the risks, benefits and alternatives to the procedure(s) and the patient would like to proceed.  This procedure is part of a comprehensive and multimodal treatment plan to facilitate physical therapy and a supervised home exercise program.   As far as medication, the patient was unable to increase his gabapentin and currently is taking 300 mg 3 times daily.  Recommended patient try to slowly increase his dose of gabapentin for additional neuropathic/radicular pain relief.  At this time he will increase his gabapentin to 300 mg twice daily and 600 mg at bedtime.  Plan reviewed with patient at today's visit.    -Scheduled to move forward with an interlaminar lumbar epidural steroid injection at L4-5 under fluoroscopic guidance.  Reviewed risks and benefits and the patient would like to proceed.    -Advised patient to increase gabapentin to 300 mg twice daily and 600 mg at bedtime. The patient was counseled on the risks and potential side effects of gabapentin as well as its importance for dosage titration. Side effects included but were not limited to, drowsiness, sedation, cognitive decline, peripheral edema, weight gain, seizures, with abrupt withdrawal.  Patient was instructed to call the office with any concerns or side effects before abruptly stopping medication.   -Recommended he continue home therapy exercises and stretches targeting his core strength, low back and lower extremity strengthening.  Advised patient to do these exercises 5 to 6 days a week for optimal  benefit.  -Patient will follow-up in our office approximately 6 weeks after his injection for reevaluation.

## 2025-03-12 NOTE — PROGRESS NOTES
Subjective   Patient ID: Ricardo Nam is a 65 y.o. male who presents for lumbar stenosis with claudication  HPI    65-year-old male presents for follow-up of chronic right-sided lumbar radicular pain.  Pain began approximately 1 year ago without any inciting event.  Experiencing low back pain radiating into his right buttock/hip and into his right lower extremity.  Symptoms consistent with lumbar stenosis with claudication as well as lumbar radiculopathy.  He has done formal physical therapy multiple times in the past.  MRI lumbar spine consistent with multilevel degenerative changes; mild to moderate central canal and lateral recess stenosis at L4-5 due to ligamentum flavum hypertrophy and facet hypertrophy; patient also has an element of stenosis at L2-3 as well as L3-4 which may be contributing to radicular symptoms.  Patient called our office after his last and wished to repeat an interlaminar lumbar epidural steroid injection.  As far as medication, discussed slowly increasing his gabapentin dose for additional neuropathic pain relief. Presenting at today's office visit for follow-up after an interlaminar lumbar epidural steroid injection at L3-4.  Reviewed imaging and procedure with Dr. Byrd who attempted and interlaminar lumbar epidural steroid injection at 1 level higher to capture stenosis at L2-3 as well as L3-4.  Patient states he received moderate relief with this injection.  He states that it took a little longer for him to receive relief with this injection; approximately 10 to 14 days.  He has noted improvement in the intensity/sharpness of his pain after his injection, however, he continues to experience radicular symptoms radiating to his right lower extremity to the level of his ankle. He has intermittent numbness and tingling in his right lower extremity and right foot.  Denies any lower extremity weakness or changes in bowel/bladder function.  Pain increases with prolonged standing,  "walking.  Pain interrupts his sleep.  Pain is described as aching and throbbing.  He felt that he may have done better with the first injection at L4-5 with more immediate and lasting relief. He does feel that he seen his gabapentin dose has added additional pain relief.  Currently tolerating 300 mg 3 times daily.  Continues home exercises and stretches.      Location of Pain: low back pain-right side, still has pain, tingling down right leg to thigh. Back pain has improved, leg painnot as frequent. Patient presents today post repeat L3/4 interlaminar AREN and reports minimal relief. States that symptoms are still present but they are \"Muted\" in intensity. Pain is worst with prolonged inactivity.          Pain Score: 6/10     Other pain medication/neuromodulator: gabapentin 300mg BID and 600mg HS -- unsure if needs refilelod, will call if so, THC gummies, meloxicam-takes prn     Therapeutic Goals:     Injections and/or Procedures: 11/8/24 L3/L4 with relief, repeat L3/4 on 01/31/2025 with                 OARRS:  Laina Zhang, APRN-CNP, APRN-CNS on 3/12/2025 12:51 PM  I have personally reviewed the OARRS report for Ricardo Nam. I have considered the risks of abuse, dependence, addiction and diversion        Review of Systems      ROS:   General: No fevers, chills, weight loss  Skin: Negative for lesions  Eyes: No acute vision changes  Ears: No vertigo  Nose, mouth, throat: No difficulty swallowing or speaking  Respiratory: No cough, shortness of breath, cyanosis  Cardiovascular: Negative for chest pain syncope or palpitation  Gastrointestinal: No constipation, nausea, vomiting  Neurological: Negative for headache, positive for: Paresthesia  Psychological: Negative for severe or debilitating anxiety, depression. Negative memory loss  Musculoskeletal: Positive for arthralgia, myalgia and pain  Endocrine: Negative for weight gain, appetite changes, excessive sweating  Allergy/immune: Negative    All 13 systems were " reviewed and are within normal levels except as noted or in the history of present illness.  Positive or pertinent negative responses are noted or were in the history of present illness. As noted, the patient denies significant or impairing weakness in the bilateral upper and lower extremities, medication induced constipation, and bowel or bladder incontinence.      Current Outpatient Medications   Medication Instructions    aspirin 81 mg, Daily RT    diclofenac sodium (VOLTAREN) 4 g, 4 times daily PRN    doxepin (SINEQUAN) 10 mg, Nightly    ezetimibe (Zetia) 10 mg tablet 1 tablet, Daily (0630)    gabapentin (Neurontin) 300 mg capsule Take 300 mg of gabapentin twice daily and 600 mg at bedtime.    Jardiance 25 mg 1 tablet, Daily (0630)    lansoprazole (PREVACID) 15 mg, Daily before breakfast    losartan (COZAAR) 50 mg, Daily    meloxicam (MOBIC) 15 mg, Daily    metoprolol succinate XL (Toprol-XL) 25 mg 24 hr tablet 1 tablet, Daily (0630)    nebivolol (BYSTOLIC) 10 mg, Daily RT    rosuvastatin (CRESTOR) 40 mg, Daily        Past Medical History:   Diagnosis Date    Diabetes type 2, controlled (Multi)     Hyperlipidemia     Hypertension         Past Surgical History:   Procedure Laterality Date    APPENDECTOMY      VASECTOMY          No family history on file.     No Known Allergies     MR lumbar spine wo IV contrast 08/19/2024    Narrative  Interpreted By:  Drew Albert,  STUDY:  MR LUMBAR SPINE WO IV CONTRAST;  8/19/2024 8:16 am    INDICATION:  Signs/Symptoms:..    COMPARISON:  None.    ACCESSION NUMBER(S):  [id number]    ORDERING CLINICIAN:  VIVIAN GIBSON    TECHNIQUE:  The lumbar spine was studied in the sagital, axial and coronal planes  utiliing T1 and T2 weighted images.    FINDINGS:  The marrow signal and vertebral body height are normal. The conus and  sacrum are normal. Images at each interspace reveal the following:  T12/L1  There is normal alignment and vertebral body height. The disc space  is  normal. There is no evidence of canal or foraminal narrowing.  There is no evidence of bulging or herniated disc. L1/L2  There is normal alignment and vertebral body height. The disc space  is normal. There is no evidence of canal or foraminal narrowing.  There is no evidence of bulging or herniated disc. L2/L3  There is normal alignment and vertebral body height. The disc space  is normal. There is no evidence of canal or foraminal narrowing.  There is no evidence of bulging or herniated disc. L3/L4  Bulging intervertebral disc and bilateral facet hypertrophy without  canal stenosis. Moderate bilateral foraminal narrowing without focal  disc herniation L4/L5  Circumferential bulging intervertebral disc. Bilateral facet  hypertrophy. No measurable canal stenosis. Moderate bilateral  foraminal narrowing. L5/S1  Circumferential bulging intervertebral disc. Bilateral facet  hypertrophy. No measurable canal stenosis. Moderate/advanced  bilateral foraminal narrowing.    Impression  * Lumbar spondylosis as described  *No measurable canal stenosis or focal disc herniation.    MACRO:  none    Signed by: Drew Albert 8/19/2024 10:15 AM  Dictation workstation:   [id number]      Objective     Vitals:    03/12/25 1133   BP: 105/74   Pulse: 85   Resp: 18   SpO2: 96%               Physical Exam    GENERAL EXAM  Vital Signs: Vital signs to include heart rate, respiration rate, blood pressure, and temperature were reviewed.  General Appearance:  Awake, alert, healthy appearing, well developed, No acute distress.  Head: Normocephalic without evidence of head injury.  Neck: The appearance of the neck was normal without swelling with a midline trachea.  Eyes: The eyelids and eyebrows exhibited no abnormalities.  Pupils were not pin-point.  Sclera was without icterus.  Lungs: Respiration rhythm and depth was normal.  Respiratory movements were normal without labored breathing.  Cardiovascular: No peripheral edema was present.     Spine, lumbar: Patient is able to rise from seated to standing position without hesitancy, push off or delay.  Tenderness to paraspinous musculature lower lumbar region right. Flexion intact with extension more limited and increasing low back pain.  Positive straight leg raise on the right.  Positive for right SI joint tenderness.  Mildly positive Carol, Gaenslen's and sacral thrust on the right.  Neurological: Patient was oriented to time, place, and person.  Speech was normal.  Balance, gait, and stance were unremarkable.    Psychiatric: Appearance was normal with appropriate dress.  Mood was euthymic and affect was normal.  Skin: Affected regions were without ecchymosis or skin lesions.              Assessment/Plan   Problem List Items Addressed This Visit             ICD-10-CM    Lumbar stenosis with neurogenic claudication - Primary M48.062     65 year old male presenting for follow-up of right-sided lumbar radicular symptoms consistent with lumbar stenosis/claudication.  MRI of his lumbar spine  consistent with multilevel degenerative changes with mild to moderate central canal and lateral recess stenosis at L2-3, L3-4 and L4-5; ligamentum flavum hypertrophy and facet arthropathy most noted at L4-5.  Previously completed physical therapy with limited relief.  Restarted gabapentin and was instructed to slowly increase his dose to 300 mg twice per day and 600 at bedtime.  He has done well with previous interlaminar lumbar epidural steroid injection at L4-5 providing significant/sustained relief.  He called our office after his last appointment when he noted return of symptoms and was scheduled to repeat an interlaminar lumbar epidural steroid injection which was done 1 level higher to capture stenosis at L2-3 and L3-4.  Presenting at today's office visit for follow-up after an interlaminar lumbar epidural steroid injection at L3-4 targeting lumbar radicular/lumbar stenosis symptoms.  Patient states that he  received moderate relief with this injection.  He has noted a decrease in the intensity/sharpness of his pain.  He does continue to experience lumbar pain radiating to his right lower extremity to the level of his ankle.  Intermittent numbness and tingling in his right lower extremity.  He denies any weakness or changes in bowel/bladder function.  Medical necessity: The patient is presenting primarily with Lumbar pain and radicular symptoms and claudication symptoms.  The pain is constant and of moderate severity that interferes with activities of daily living and sleep. The patient failed conservative therapy to include Tylenol/NSAIDS and physical therapy/supervised home exercise program and continues to participate in their supervised home exercise program.  Lumbar spine MRI which I personally reviewed showed multilevel degenerative changes with mild to moderate central canal and lateral recess stenosis at L2-3, L3-4 and L4-5; ligamentum flavum hypertrophy and facet arthropathy most noted at L4-5. The patient has previously undergone a Lumbar Intralaminar Epidural Steroid Injection at L3-4 inadequate pain relief.  Based on these results will plan to change the level(s) of the injection.  Will proceed with Lumbar Intralaminar Epidural Steroid Injection at L4-5 with fluoroscopy.  We discussed the risks, benefits and alternatives to the procedure(s) and the patient would like to proceed.  This procedure is part of a comprehensive and multimodal treatment plan to facilitate physical therapy and a supervised home exercise program.   As far as medication, the patient was unable to increase his gabapentin and currently is taking 300 mg 3 times daily.  Recommended patient try to slowly increase his dose of gabapentin for additional neuropathic/radicular pain relief.  At this time he will increase his gabapentin to 300 mg twice daily and 600 mg at bedtime.  Plan reviewed with patient at today's visit.    -Scheduled to move  forward with an interlaminar lumbar epidural steroid injection at L4-5 under fluoroscopic guidance.  Reviewed risks and benefits and the patient would like to proceed.    -Advised patient to increase gabapentin to 300 mg twice daily and 600 mg at bedtime. The patient was counseled on the risks and potential side effects of gabapentin as well as its importance for dosage titration. Side effects included but were not limited to, drowsiness, sedation, cognitive decline, peripheral edema, weight gain, seizures, with abrupt withdrawal.  Patient was instructed to call the office with any concerns or side effects before abruptly stopping medication.   -Recommended he continue home therapy exercises and stretches targeting his core strength, low back and lower extremity strengthening.  Advised patient to do these exercises 5 to 6 days a week for optimal benefit.  -Patient will follow-up in our office approximately 6 weeks after his injection for reevaluation.           Relevant Medications    gabapentin (Neurontin) 300 mg capsule    Other Relevant Orders    [place] pain management    Epidural Steroid Injection    Lumbar spondylosis M47.816    Relevant Medications    gabapentin (Neurontin) 300 mg capsule              This note was generated with the aid of dictation software, there may be typos despite my attempts at proofreading.

## 2025-04-04 ENCOUNTER — HOSPITAL ENCOUNTER (OUTPATIENT)
Dept: GASTROENTEROLOGY | Facility: HOSPITAL | Age: 65
Discharge: HOME | End: 2025-04-04
Payer: MEDICARE

## 2025-04-04 VITALS
BODY MASS INDEX: 28.63 KG/M2 | HEART RATE: 82 BPM | TEMPERATURE: 97.2 F | RESPIRATION RATE: 16 BRPM | SYSTOLIC BLOOD PRESSURE: 130 MMHG | DIASTOLIC BLOOD PRESSURE: 80 MMHG | OXYGEN SATURATION: 98 % | WEIGHT: 200 LBS | HEIGHT: 70 IN

## 2025-04-04 DIAGNOSIS — M48.062 LUMBAR STENOSIS WITH NEUROGENIC CLAUDICATION: ICD-10-CM

## 2025-04-04 PROCEDURE — 2500000004 HC RX 250 GENERAL PHARMACY W/ HCPCS (ALT 636 FOR OP/ED): Mod: JZ | Performed by: PHYSICAL MEDICINE & REHABILITATION

## 2025-04-04 PROCEDURE — 2550000001 HC RX 255 CONTRASTS: Performed by: PHYSICAL MEDICINE & REHABILITATION

## 2025-04-04 PROCEDURE — 62323 NJX INTERLAMINAR LMBR/SAC: CPT | Performed by: PHYSICAL MEDICINE & REHABILITATION

## 2025-04-04 RX ORDER — METHYLPREDNISOLONE ACETATE 40 MG/ML
INJECTION, SUSPENSION INTRA-ARTICULAR; INTRALESIONAL; INTRAMUSCULAR; SOFT TISSUE AS NEEDED
Status: COMPLETED | OUTPATIENT
Start: 2025-04-04 | End: 2025-04-04

## 2025-04-04 RX ORDER — LIDOCAINE HYDROCHLORIDE 5 MG/ML
INJECTION, SOLUTION INFILTRATION; INTRAVENOUS AS NEEDED
Status: COMPLETED | OUTPATIENT
Start: 2025-04-04 | End: 2025-04-04

## 2025-04-04 RX ADMIN — IOHEXOL 5 ML: 350 INJECTION, SOLUTION INTRAVENOUS at 10:39

## 2025-04-04 RX ADMIN — METHYLPREDNISOLONE ACETATE 40 MG: 40 INJECTION, SUSPENSION INTRA-ARTICULAR; INTRALESIONAL; INTRAMUSCULAR; SOFT TISSUE at 10:40

## 2025-04-04 RX ADMIN — LIDOCAINE HYDROCHLORIDE 15 ML: 5 INJECTION, SOLUTION INFILTRATION at 10:38

## 2025-04-04 ASSESSMENT — PAIN SCALES - GENERAL
PAINLEVEL_OUTOF10: 0 - NO PAIN
PAINLEVEL_OUTOF10: 5 - MODERATE PAIN

## 2025-04-04 ASSESSMENT — PAIN - FUNCTIONAL ASSESSMENT
PAIN_FUNCTIONAL_ASSESSMENT: 0-10
PAIN_FUNCTIONAL_ASSESSMENT: 0-10

## 2025-04-04 ASSESSMENT — PAIN DESCRIPTION - DESCRIPTORS: DESCRIPTORS: SHARP;STABBING

## 2025-04-04 NOTE — DISCHARGE INSTRUCTIONS
DISCHARGE INSTRUCTIONS FOR INJECTIONS     After most injections, it is recommended that you relax and limit your activity for the remainder of the day unless you have been told otherwise by your pain physician.  You should not drive a car, operate machinery, or make important legal decisions unless otherwise directed by your pain physician.  You may resume your normal activity, including exercise, tomorrow.      Keep a written pain diary of how much pain relief you experienced following the injection procedure and the length of time of pain relief you experienced pain relief. Following diagnostic injections like medial branch nerve blocks, genicular nerve blocks, sacroiliac joint blocks, stellate ganglion injections and other blocks, it is very important you record the specific amount of pain relief you experienced immediately after the injection and how long it lasted. If you have been given Questionnaire paperwork to fill out out after your diagnostic block please call [phone] and leave a detailed voicemail with the answers to the questions you were given. This information is vital to getting approval for next steps.    Observe the needle site for excessive bleeding (slow general oozing that completely soaks the dressing or fresh bright red bleeding).  In either case, apply pressure to the area, elevate it if possible and call your doctor at once.      For all injections, please keep the injection site dry and inspect the site for a couple of days. You may remove the Band-Aid the day of the injection at any time.     Keep the needle site clean & dry for 24 hours.   no soaking baths, hot tubs, whirlpools or swimming pools for 2-3 days.     Some discomfort, bruising or slight swelling may occur at the injection site. This is not abnormal if it occurs.  If needed you may:    -Take over the counter medication such as Tylenol or Motrin.   -Apply an ice pack for 30 minutes, 2 to 3 times a day for the first 24  hours.     If you are given steroids in your injection, your pain may not be gone immediately after this procedure. It generally takes 3-5 days for the steroid to work but it can take up to 2 weeks. may You may notice a worsening of your symptoms for 1-2 days after the injection. This is not abnormal.  You may use acetaminophen, ibuprofen, or prescription medication that your doctor may have prescribed for you if you need to do so.     A few common side effects of steroids include facial flushing, sweating, restlessness, irritability,difficulty sleeping, increase in blood sugar, and increased blood pressure. If you have diabetes, please monitor your blood sugar at least once a day for at least 5 days. If you have poorly controlled high blood pressure, monitoryour blood pressure for at least 2 days and contact your primary care physician if these numbers are unusually high for you.        Call  Pain Management at [phone] between 8am-4pm Monday - Friday if you are experiencing the following:    If you received an epidural or spinal injection:    -Headache that does not go away with medicine, is worse when sitting or standing up, and is greatly relieved upon lying down.   -Severe pain worse than or different than your baseline pain.   -Chills or fever (101º F or greater).   -Drainage or signs of infection at the injection site     Go directly to the Emergency Department if you are experiencing the following and received an epidural or spinal injection:   -Abrupt weakness or progressive weakness in your legs that starts after you leave the clinic.   -Abrupt severe or worsening numbness in your legs.   -Inability to urinate after the injection or loss of bowel or bladder control without the urge to defecate or urinate.     If you have a clinical question that cannot wait until your next appointment, please call [phone] between 8am-4pm Monday - Friday. We do our best to return all non-emergency messages within  24-48 hours, Monday - Friday. A nurse or physician will return your message. You may also try calling and they will do their best to answer your question(s):    Deanna Byrd's nurse [phone]  [facility] Pain Management nurse [phone]  After hours pain management [phone]    If you need to cancel an appointment, please call the scheduling staff at [phone] during normal business hours or leave a message at least 24 hours in advance.

## 2025-05-14 ENCOUNTER — OFFICE VISIT (OUTPATIENT)
Dept: PAIN MEDICINE | Facility: HOSPITAL | Age: 65
End: 2025-05-14
Payer: MEDICARE

## 2025-05-14 VITALS
WEIGHT: 190 LBS | DIASTOLIC BLOOD PRESSURE: 73 MMHG | HEART RATE: 91 BPM | RESPIRATION RATE: 16 BRPM | OXYGEN SATURATION: 97 % | SYSTOLIC BLOOD PRESSURE: 110 MMHG | BODY MASS INDEX: 27.2 KG/M2 | HEIGHT: 70 IN

## 2025-05-14 DIAGNOSIS — M48.062 LUMBAR STENOSIS WITH NEUROGENIC CLAUDICATION: Primary | ICD-10-CM

## 2025-05-14 PROCEDURE — 99214 OFFICE O/P EST MOD 30 MIN: CPT | Performed by: CLINICAL NURSE SPECIALIST

## 2025-05-14 PROCEDURE — 1159F MED LIST DOCD IN RCRD: CPT | Performed by: CLINICAL NURSE SPECIALIST

## 2025-05-14 PROCEDURE — 1160F RVW MEDS BY RX/DR IN RCRD: CPT | Performed by: CLINICAL NURSE SPECIALIST

## 2025-05-14 PROCEDURE — 3008F BODY MASS INDEX DOCD: CPT | Performed by: CLINICAL NURSE SPECIALIST

## 2025-05-14 PROCEDURE — G2211 COMPLEX E/M VISIT ADD ON: HCPCS | Performed by: CLINICAL NURSE SPECIALIST

## 2025-05-14 ASSESSMENT — COLUMBIA-SUICIDE SEVERITY RATING SCALE - C-SSRS
1. IN THE PAST MONTH, HAVE YOU WISHED YOU WERE DEAD OR WISHED YOU COULD GO TO SLEEP AND NOT WAKE UP?: NO
6. HAVE YOU EVER DONE ANYTHING, STARTED TO DO ANYTHING, OR PREPARED TO DO ANYTHING TO END YOUR LIFE?: NO
2. HAVE YOU ACTUALLY HAD ANY THOUGHTS OF KILLING YOURSELF?: NO

## 2025-05-14 ASSESSMENT — ENCOUNTER SYMPTOMS
LOSS OF SENSATION IN FEET: 0
DEPRESSION: 0
OCCASIONAL FEELINGS OF UNSTEADINESS: 0

## 2025-05-14 ASSESSMENT — PATIENT HEALTH QUESTIONNAIRE - PHQ9
SUM OF ALL RESPONSES TO PHQ9 QUESTIONS 1 AND 2: 0
2. FEELING DOWN, DEPRESSED OR HOPELESS: NOT AT ALL
1. LITTLE INTEREST OR PLEASURE IN DOING THINGS: NOT AT ALL

## 2025-05-14 NOTE — ASSESSMENT & PLAN NOTE
65 year old male presenting for follow-up of right-sided lumbar radicular symptoms consistent with lumbar stenosis/claudication.  MRI of his lumbar spine  consistent with multilevel degenerative changes with mild to moderate central canal and lateral recess stenosis at L2-3, L3-4 and L4-5; ligamentum flavum hypertrophy and facet arthropathy most noted at L4-5.  Previously completed physical therapy with limited relief.  Restarted gabapentin 300 mg 3 times daily and was told to take this medication consistently for optimal relief. He has done well with previous interlaminar lumbar epidural steroid injection at L4-5 providing significant/sustained relief.  He repeated an interlaminar lumbar epidural steroid injection which was done 1 level higher to capture stenosis at L2-3 and L3-4.  Unfortunately this injection provided limited relief.  Patient noted worsening symptoms of lumbar stenosis/lumbar neuritis and was scheduled to repeat his original injection, an interlaminar lumbar epidural steroid injection at L4-5.  Presenting at today's office visit for follow-up after repeating this injection.  Patient endorsing greater than 75% relief with his recent injection.  States he has been able to return to normal activity and working a full day without significant pain.  He has very little radicular pain at this time and has noted improvement in numbness/tingling in his right lower extremity.  Advised patient that he may repeat his interlaminar lumbar epidural steroid injection at L4-5 every 3 to 4 months as needed.  As far as medication, the patient is currently tolerating gabapentin 300 mg 3 times daily and feels that it has been helpful.  Reviewed with patient that he may benefit from an increase in his dose in the future.  Plan reviewed with patient at today's visit.    - Advised patient that he may repeat his interlaminar lumbar epidural steroid injection at L4-5 every 3 to 4 months as needed.  Currently feels that he  is receiving significant relief with his most recent injection.   -Advised patient to continue gabapentin 300 mg 3 times daily.  Recommended patient may take 300 mg in the morning and 600 mg at bedtime if he notices daytime drowsiness. The patient was counseled on the risks and potential side effects of gabapentin as well as its importance for dosage titration. Side effects included but were not limited to, drowsiness, sedation, cognitive decline, peripheral edema, weight gain, seizures, with abrupt withdrawal.  Patient was instructed to call the office with any concerns or side effects before abruptly stopping medication.   -Recommended he continue home therapy exercises and stretches targeting his core strength, low back and lower extremity strengthening.  Advised patient to do these exercises 5 to 6 days a week for optimal benefit.  -Patient will follow-up in our office in approximately 6 months for reevaluation.  He may call to repeat his epidural steroid injection if symptoms should return.

## 2025-05-14 NOTE — PROGRESS NOTES
Subjective   Patient ID: Ricardo Nam is a 65 y.o. male who presents for lumbar stenosis with claudication.  HPI    65-year-old male presents for follow-up of chronic right-sided lumbar radicular pain which began approximately a year ago without an inciting event.  His low back pain was radiating into his right buttock/hip and into his right lower extremity.  His symptoms and exam were consistent with lumbar stenosis with claudication as well as lumbar radiculopathy.  He has completed formal physical therapy multiple times in the past.  Continues home therapy stretches and exercises.  MRI lumbar spine consistent with multilevel degenerative changes; mild to moderate central canal lateral recess stenosis at L4-5 due to ligamentum flavum hypertrophy and facet hypertrophy; patient also has an element of stenosis at L2-3 as well as L3-4 which may be contributing to radicular symptoms.  Most recent interlaminar lumbar epidural steroid injection at L3-4 provided more limited relief.  He continued to experience radicular symptoms after this injection.  The patient felt that he may have done better with his initial injection which was an interlaminar lumbar epidural steroid injection at L4-5 which provided immediate/sustained relief.  At that point the patient was scheduled to repeat an interlaminar lumbar epidural steroid injection at L4-5.  He is presenting at today's office visit to follow-up after his interlaminar lumbar epidural steroid injection at L4-5.  Patient received greater than 75% relief.  He states that he has very little radicular pain in his right lower extremity.  He has also noted significant improvement in numbness and tingling in his right lower extremity.  Denies any weakness or changes in bowel/bladder function.  He has been able to return to normal activity secondary to improvement in pain.  He has a very physical job and states that he is able to work a full day with minimal pain.  States he has  not felt this good in several years.  He continues to do well with gabapentin taking 300 mg 3 times daily.  Continues home therapy exercises and stretches.      Location of Pain: Patient is here for injection follow up. Pt had low back pain-right side with tingling down right leg to thigh.     OSWESTRY SCORE 16          Pain Score: 3/10       Pain at worst: 8/10     Other pain medication/neuromodulator: gabapentin 300mg TID, will call if so, THC gummies     Injections and/or Procedures:   11/8/24 L3/L4 with relief, repeat L3/4 on 01/31/2025 4/4/25 L4/L5 Interlaminar AREN-75% relief  OARRS:  Laina Zhang, APRN-CNP, APRN-CNS on 5/14/2025  1:34 PM  I have personally reviewed the OARRS report for Ricardo Nam. I have considered the risks of abuse, dependence, addiction and diversion      Review of Systems    ROS:   General: No fevers, chills, weight loss  Skin: Negative for lesions  Eyes: No acute vision changes  Ears: No vertigo  Nose, mouth, throat: No difficulty swallowing or speaking  Respiratory: No cough, shortness of breath, cyanosis  Cardiovascular: Negative for chest pain syncope or palpitation  Gastrointestinal: No constipation, nausea, vomiting  Neurological: Negative for headache, positive for: Intermittent paresthesia right lower extremity  Psychological: Negative for severe or debilitating anxiety, depression. Negative memory loss  Musculoskeletal: Positive for arthralgia, myalgia and pain   Endocrine: Negative for weight gain, appetite changes, excessive sweating  Allergy/immune: Negative    All 13 systems were reviewed and are within normal levels except as noted or in the history of present illness.  Positive or pertinent negative responses are noted or were in the history of present illness. As noted, the patient denies significant or impairing weakness in the bilateral upper and lower extremities, medication induced constipation, and bowel or bladder incontinence.   Current Medications[1]  "    Medical History[2]     Surgical History[3]     Family History[4]     RX Allergies[5]     Objective     Visit Vitals  /73   Pulse 91   Resp 16   Ht 1.778 m (5' 10\")   Wt 86.2 kg (190 lb)   SpO2 97%   BMI 27.26 kg/m²   Smoking Status Every Day   BSA 2.06 m²        Physical Exam    PE:  General: Well-developed, well-nourished, no acute distress. The patient demonstrates no pain behavior, symptom magnification or overt drug-seeking behavior.  Eye: Pupils appropriate for room lighting  Neck/thyroid: No obvious goiter or enlargement of neck noted  Respiratory exam: Normal respiratory effort, unlabored respiration. No accessory muscle use noted  Cardiac exam: Bilateral radial pulses intact  Abdominal: Nondistended  Spine, lumbar: The patient is able to rise from a seated to standing position without hesitancy, push off, or delay. Gait is grossly nonantalgic.  Minimal tenderness to paraspinous musculature is noted lower lumbar region bilaterally.  Flexion and extension intact.  Negative straight leg raise bilaterally.  Negative SI tenderness.  Neurologic exam: Muscle strength is antigravity in all 4 extremities.  Equal strength bilateral lower extremities.  Psychiatric exam: Judgment and insight normal, affect normal, speech is fluent, affect appropriate, demonstrating no signs of hypersomnolence, sedation, or confusion        Assessment/Plan   Problem List Items Addressed This Visit           ICD-10-CM    Lumbar stenosis with neurogenic claudication - Primary M48.062    65 year old male presenting for follow-up of right-sided lumbar radicular symptoms consistent with lumbar stenosis/claudication.  MRI of his lumbar spine  consistent with multilevel degenerative changes with mild to moderate central canal and lateral recess stenosis at L2-3, L3-4 and L4-5; ligamentum flavum hypertrophy and facet arthropathy most noted at L4-5.  Previously completed physical therapy with limited relief.  Restarted gabapentin 300 mg " 3 times daily and was told to take this medication consistently for optimal relief. He has done well with previous interlaminar lumbar epidural steroid injection at L4-5 providing significant/sustained relief.  He repeated an interlaminar lumbar epidural steroid injection which was done 1 level higher to capture stenosis at L2-3 and L3-4.  Unfortunately this injection provided limited relief.  Patient noted worsening symptoms of lumbar stenosis/lumbar neuritis and was scheduled to repeat his original injection, an interlaminar lumbar epidural steroid injection at L4-5.  Presenting at today's office visit for follow-up after repeating this injection.  Patient endorsing greater than 75% relief with his recent injection.  States he has been able to return to normal activity and working a full day without significant pain.  He has very little radicular pain at this time and has noted improvement in numbness/tingling in his right lower extremity.  Advised patient that he may repeat his interlaminar lumbar epidural steroid injection at L4-5 every 3 to 4 months as needed.  As far as medication, the patient is currently tolerating gabapentin 300 mg 3 times daily and feels that it has been helpful.  Reviewed with patient that he may benefit from an increase in his dose in the future.  Plan reviewed with patient at today's visit.    - Advised patient that he may repeat his interlaminar lumbar epidural steroid injection at L4-5 every 3 to 4 months as needed.  Currently feels that he is receiving significant relief with his most recent injection.   -Advised patient to continue gabapentin 300 mg 3 times daily.  Recommended patient may take 300 mg in the morning and 600 mg at bedtime if he notices daytime drowsiness. The patient was counseled on the risks and potential side effects of gabapentin as well as its importance for dosage titration. Side effects included but were not limited to, drowsiness, sedation, cognitive decline,  peripheral edema, weight gain, seizures, with abrupt withdrawal.  Patient was instructed to call the office with any concerns or side effects before abruptly stopping medication.   -Recommended he continue home therapy exercises and stretches targeting his core strength, low back and lower extremity strengthening.  Advised patient to do these exercises 5 to 6 days a week for optimal benefit.  -Patient will follow-up in our office in approximately 6 months for reevaluation.  He may call to repeat his epidural steroid injection if symptoms should return.                     [1]   Current Outpatient Medications:     aspirin 81 mg chewable tablet, Chew 1 tablet (81 mg) once daily., Disp: , Rfl:     doxepin (SINEquan) 10 mg capsule, Take 1 capsule (10 mg) by mouth once daily at bedtime., Disp: , Rfl:     ezetimibe (Zetia) 10 mg tablet, Take 1 tablet (10 mg) by mouth early in the morning.., Disp: , Rfl:     gabapentin (Neurontin) 300 mg capsule, Take 300 mg of gabapentin twice daily and 600 mg at bedtime., Disp: 120 capsule, Rfl: 2    Jardiance 25 mg, Take 1 tablet (25 mg) by mouth early in the morning.., Disp: , Rfl:     lansoprazole (Prevacid) 15 mg DR capsule, Take 1 capsule (15 mg) by mouth once daily in the morning. Take before meals., Disp: , Rfl:     losartan (Cozaar) 50 mg tablet, Take 1 tablet (50 mg) by mouth once daily., Disp: , Rfl:     metoprolol succinate XL (Toprol-XL) 25 mg 24 hr tablet, Take 1 tablet (25 mg) by mouth early in the morning.., Disp: , Rfl:     nebivolol (Bystolic) 10 mg tablet, Take 1 tablet (10 mg) by mouth once daily., Disp: , Rfl:     rosuvastatin (Crestor) 40 mg tablet, Take 1 tablet (40 mg) by mouth once daily., Disp: , Rfl:     semaglutide 0.25 mg or 0.5 mg (2 mg/3 mL) pen injector, Inject under the skin., Disp: , Rfl:     diclofenac sodium (Voltaren) 1 % gel, Apply 4.5 inches (4 g) topically 4 times a day as needed. (Patient not taking: Reported on 11/27/2024), Disp: , Rfl:   [2]    Past Medical History:  Diagnosis Date    Back pain     Diabetes type 2, controlled (Multi)     GERD (gastroesophageal reflux disease)     Hyperlipidemia     Hypertension     Type 2 diabetes mellitus    [3]   Past Surgical History:  Procedure Laterality Date    APPENDECTOMY      CORONARY ARTERY BYPASS GRAFT  2018    3-vessel    VASECTOMY     [4]   Family History  Problem Relation Name Age of Onset    Heart disease Mother      Diabetes type II Mother      Heart attack Father          passed away age 53.   [5] No Known Allergies

## 2025-11-12 ENCOUNTER — APPOINTMENT (OUTPATIENT)
Dept: PAIN MEDICINE | Facility: HOSPITAL | Age: 65
End: 2025-11-12
Payer: MEDICARE